# Patient Record
Sex: MALE | Race: OTHER | Employment: FULL TIME | URBAN - METROPOLITAN AREA
[De-identification: names, ages, dates, MRNs, and addresses within clinical notes are randomized per-mention and may not be internally consistent; named-entity substitution may affect disease eponyms.]

---

## 2017-04-03 RX ORDER — LISINOPRIL 20 MG/1
TABLET ORAL
Qty: 90 TABLET | Refills: 0 | Status: SHIPPED | OUTPATIENT
Start: 2017-04-03 | End: 2017-06-29

## 2017-04-03 NOTE — TELEPHONE ENCOUNTER
Refill given, but he's due for 6-month f/u. Make OV. Dianna Poole. Daren Multani MD  Diplomate, American Board of Internal Medicine  [facility]  [address] [place] [address],[address]Th [address], [address], [facility], [address] [place] [address]  T: [id number]; F: Tamekaeti 5

## 2017-05-04 RX ORDER — LEVOTHYROXINE SODIUM 0.07 MG/1
TABLET ORAL
Qty: 90 TABLET | Refills: 0 | Status: SHIPPED | OUTPATIENT
Start: 2017-05-04 | End: 2018-01-29

## 2017-05-19 ENCOUNTER — OFFICE VISIT (OUTPATIENT)
Dept: INTERNAL MEDICINE CLINIC | Facility: CLINIC | Age: 62
End: 2017-05-19

## 2017-05-19 VITALS
BODY MASS INDEX: 36.03 KG/M2 | HEART RATE: 81 BPM | SYSTOLIC BLOOD PRESSURE: 132 MMHG | TEMPERATURE: 98 F | RESPIRATION RATE: 20 BRPM | WEIGHT: 266 LBS | DIASTOLIC BLOOD PRESSURE: 80 MMHG | OXYGEN SATURATION: 98 % | HEIGHT: 72 IN

## 2017-05-19 DIAGNOSIS — E66.01 SEVERE OBESITY (BMI 35.0-39.9) WITH COMORBIDITY (HCC): ICD-10-CM

## 2017-05-19 DIAGNOSIS — K76.0 FATTY LIVER DISEASE, NONALCOHOLIC: ICD-10-CM

## 2017-05-19 DIAGNOSIS — E06.3 HASHIMOTO'S THYROIDITIS: ICD-10-CM

## 2017-05-19 DIAGNOSIS — N52.03 COMBINED ARTERIAL INSUFFICIENCY AND CORPORO-VENOUS OCCLUSIVE ERECTILE DYSFUNCTION: ICD-10-CM

## 2017-05-19 DIAGNOSIS — R97.20 ELEVATED PSA: ICD-10-CM

## 2017-05-19 DIAGNOSIS — R74.01 TRANSAMINITIS: ICD-10-CM

## 2017-05-19 DIAGNOSIS — I10 ESSENTIAL HYPERTENSION: Primary | ICD-10-CM

## 2017-05-19 DIAGNOSIS — R73.03 PREDIABETES: ICD-10-CM

## 2017-05-19 PROCEDURE — 99214 OFFICE O/P EST MOD 30 MIN: CPT | Performed by: INTERNAL MEDICINE

## 2017-05-20 PROBLEM — N52.03 COMBINED ARTERIAL INSUFFICIENCY AND CORPORO-VENOUS OCCLUSIVE ERECTILE DYSFUNCTION: Status: ACTIVE | Noted: 2017-05-20

## 2017-05-20 PROBLEM — E66.01 SEVERE OBESITY (BMI 35.0-39.9) WITH COMORBIDITY (HCC): Status: ACTIVE | Noted: 2017-05-20

## 2017-05-20 NOTE — PROGRESS NOTES
Patient presents with:   Other: 6-month f/u chronic medical conditions and disease burden status eval      HPI: The pt presents for 6-month f/u chronic medical conditions and disease burden status eval.  In particular, his chronic conditions are as follows: Pulse 81  Temp(Src) 98.4 °F (36.9 °C) (Oral)  Resp 20  Ht 72\"  Wt 266 lb  BMI 36.07 kg/m2  SpO2 98%  Wt Readings from Last 6 Encounters:  05/19/17 : 266 lb  09/09/16 : 266 lb 8 oz  11/13/15 : 268 lb 8 oz  10/22/15 : 271 lb 8 oz  09/04/15 : 270 lb 8 oz  09 CPX  LIPID PANEL - CPX  URINALYSIS, ROUTINE - CPX  CBC W/DIFF - CPX  COMP METABOLIC PANEL - CPX  TSH - THYROID ORDER SET  PSA (Screening) - CPX  Microalb/Creat Ratio, Random Urine [E]  T4 FREE  - THYROID ORDER SET    Meds & Refills for this Visit:  No pres

## 2017-06-29 RX ORDER — LISINOPRIL 20 MG/1
TABLET ORAL
Qty: 90 TABLET | Refills: 0 | Status: SHIPPED | OUTPATIENT
Start: 2017-06-29 | End: 2017-09-29

## 2017-07-29 DIAGNOSIS — E06.3 HASHIMOTO'S THYROIDITIS: ICD-10-CM

## 2017-08-02 RX ORDER — LEVOTHYROXINE SODIUM 0.07 MG/1
TABLET ORAL
Qty: 90 TABLET | Refills: 0 | Status: SHIPPED | OUTPATIENT
Start: 2017-08-02 | End: 2018-01-29

## 2017-09-29 RX ORDER — LISINOPRIL 20 MG/1
20 TABLET ORAL
Qty: 90 TABLET | Refills: 0 | Status: SHIPPED | OUTPATIENT
Start: 2017-09-29 | End: 2017-12-27

## 2017-09-29 NOTE — PROGRESS NOTES
I received fax from [employer] for refill on Lisinopril. E-prescribe sent. Timmy Ta. Melanie Colon MD  Diplomate, American Board of Internal Medicine  [facility]  [address] [facility],[address] Floor, [address], [facility] & [facility], [address] [place] [address]  T: [id number]; F: Hafnarraeti 5

## 2017-12-28 RX ORDER — LISINOPRIL 20 MG/1
TABLET ORAL
Qty: 90 TABLET | Refills: 0 | Status: SHIPPED | OUTPATIENT
Start: 2017-12-28 | End: 2018-01-29

## 2017-12-28 NOTE — TELEPHONE ENCOUNTER
Medication(s) to Refill:   Pending Prescriptions Disp Refills    LISINOPRIL 20 MG Oral Tab [Pharmacy Med Name: LISINOPRIL 20MG TABLETS] 90 tablet 0     Sig: TAKE 1 TABLET(20 MG) BY MOUTH EVERY DAY           Last Time Medication was Filled:  9/29/17    Last

## 2018-01-29 ENCOUNTER — OFFICE VISIT (OUTPATIENT)
Dept: INTERNAL MEDICINE CLINIC | Facility: CLINIC | Age: 63
End: 2018-01-29

## 2018-01-29 VITALS
SYSTOLIC BLOOD PRESSURE: 110 MMHG | RESPIRATION RATE: 16 BRPM | DIASTOLIC BLOOD PRESSURE: 80 MMHG | HEART RATE: 64 BPM | HEIGHT: 73.5 IN | WEIGHT: 265.75 LBS | BODY MASS INDEX: 34.47 KG/M2 | TEMPERATURE: 99 F

## 2018-01-29 DIAGNOSIS — R74.01 TRANSAMINITIS: ICD-10-CM

## 2018-01-29 DIAGNOSIS — R73.03 PREDIABETES: ICD-10-CM

## 2018-01-29 DIAGNOSIS — K76.0 FATTY LIVER DISEASE, NONALCOHOLIC: ICD-10-CM

## 2018-01-29 DIAGNOSIS — R97.20 ELEVATED PSA: ICD-10-CM

## 2018-01-29 DIAGNOSIS — E06.3 HASHIMOTO'S THYROIDITIS: ICD-10-CM

## 2018-01-29 DIAGNOSIS — E66.9 OBESITY (BMI 30-39.9): ICD-10-CM

## 2018-01-29 DIAGNOSIS — M25.531 BILATERAL WRIST PAIN: ICD-10-CM

## 2018-01-29 DIAGNOSIS — I87.2 CHRONIC VENOUS INSUFFICIENCY: ICD-10-CM

## 2018-01-29 DIAGNOSIS — I10 ESSENTIAL HYPERTENSION: Primary | ICD-10-CM

## 2018-01-29 DIAGNOSIS — M25.549 PAIN IN MULTIPLE FINGER JOINTS: ICD-10-CM

## 2018-01-29 DIAGNOSIS — M25.532 BILATERAL WRIST PAIN: ICD-10-CM

## 2018-01-29 PROBLEM — E03.8 HYPOTHYROIDISM DUE TO HASHIMOTO'S THYROIDITIS: Status: ACTIVE | Noted: 2018-01-29

## 2018-01-29 PROBLEM — E66.01 SEVERE OBESITY (BMI 35.0-39.9) WITH COMORBIDITY (HCC): Status: RESOLVED | Noted: 2017-05-20 | Resolved: 2018-01-29

## 2018-01-29 PROCEDURE — 99214 OFFICE O/P EST MOD 30 MIN: CPT | Performed by: INTERNAL MEDICINE

## 2018-01-29 RX ORDER — LEVOTHYROXINE SODIUM 0.07 MG/1
TABLET ORAL
Qty: 90 TABLET | Refills: 3 | Status: SHIPPED | OUTPATIENT
Start: 2018-01-29 | End: 2019-02-11

## 2018-01-29 RX ORDER — LISINOPRIL 20 MG/1
TABLET ORAL
Qty: 90 TABLET | Refills: 3 | Status: SHIPPED | OUTPATIENT
Start: 2018-01-29 | End: 2018-09-05

## 2018-01-29 NOTE — PATIENT INSTRUCTIONS
Understanding Chronic Venous Insufficiency  Problems with the veins in the legs may lead to chronic venous insufficiency (CVI). CVI means that there is a long-term problem with the veins not being able to pump blood back to your heart.  When this happens, · Increase blood flow back to your heart by elevating your legs, exercising daily, and wearing elastic stockings. · Boost blood flow in your legs by losing excess weight.   · If you must stand or sit in one place for a period of time, keep your blood movin

## 2018-01-31 PROBLEM — M16.12 PRIMARY OSTEOARTHRITIS OF LEFT HIP: Status: ACTIVE | Noted: 2018-01-31

## 2018-03-28 ENCOUNTER — APPOINTMENT (OUTPATIENT)
Dept: CT IMAGING | Age: 63
End: 2018-03-28
Attending: NURSE PRACTITIONER
Payer: COMMERCIAL

## 2018-03-28 ENCOUNTER — HOSPITAL ENCOUNTER (OUTPATIENT)
Age: 63
Discharge: ANOTHER HOSPITAL | End: 2018-03-28
Attending: FAMILY MEDICINE
Payer: COMMERCIAL

## 2018-03-28 ENCOUNTER — APPOINTMENT (OUTPATIENT)
Dept: GENERAL RADIOLOGY | Age: 63
End: 2018-03-28
Attending: NURSE PRACTITIONER
Payer: COMMERCIAL

## 2018-03-28 ENCOUNTER — HOSPITAL ENCOUNTER (INPATIENT)
Facility: HOSPITAL | Age: 63
LOS: 1 days | Discharge: HOME | DRG: 418 | End: 2018-03-29
Attending: STUDENT IN AN ORGANIZED HEALTH CARE EDUCATION/TRAINING PROGRAM | Admitting: COLON & RECTAL SURGERY
Payer: COMMERCIAL

## 2018-03-28 VITALS
BODY MASS INDEX: 34 KG/M2 | DIASTOLIC BLOOD PRESSURE: 107 MMHG | HEART RATE: 93 BPM | TEMPERATURE: 99 F | RESPIRATION RATE: 18 BRPM | WEIGHT: 265 LBS | OXYGEN SATURATION: 98 % | SYSTOLIC BLOOD PRESSURE: 150 MMHG

## 2018-03-28 DIAGNOSIS — K83.8 CHOLANGIECTASIS: ICD-10-CM

## 2018-03-28 DIAGNOSIS — K81.0 ACUTE CHOLECYSTITIS: Primary | ICD-10-CM

## 2018-03-28 DIAGNOSIS — D72.829 LEUKOCYTOSIS, UNSPECIFIED TYPE: ICD-10-CM

## 2018-03-28 DIAGNOSIS — R11.2 NAUSEA AND VOMITING, INTRACTABILITY OF VOMITING NOT SPECIFIED, UNSPECIFIED VOMITING TYPE: ICD-10-CM

## 2018-03-28 DIAGNOSIS — R10.13 EPIGASTRIC PAIN: ICD-10-CM

## 2018-03-28 LAB
#LYMPHOCYTE IC: 1.2 X10ˆ3/UL (ref 0.9–3.2)
#MXD IC: 0.7 X10ˆ3/UL (ref 0.1–1)
#NEUTROPHIL IC: 12.1 X10ˆ3/UL (ref 1.3–6.7)
CREAT SERPL-MCNC: 0.8 MG/DL (ref 0.7–1.3)
GLUCOSE BLD-MCNC: 131 MG/DL (ref 70–99)
HCT IC: 47.2 % (ref 37–54)
HGB IC: 15.7 G/DL (ref 13–17)
ISTAT BLOOD GAS TCO2: 26 MMOL/L (ref 22–32)
ISTAT BUN: 15 MG/DL (ref 8–20)
ISTAT CHLORIDE: 98 MMOL/L (ref 101–111)
ISTAT HEMATOCRIT: 49 % (ref 37–53)
ISTAT IONIZED CALCIUM: 1.14 MMOL/L (ref 1.12–1.32)
ISTAT POTASSIUM: 3.8 MMOL/L (ref 3.6–5.1)
ISTAT SODIUM: 137 MMOL/L (ref 136–144)
ISTAT TROPONIN: <0.1 NG/ML (ref ?–0.1)
LYMPHOCYTES NFR BLD AUTO: 8.9 %
MCH IC: 27 PG (ref 27–33.2)
MCHC IC: 33.3 G/DL (ref 31–37)
MCV IC: 81.1 FL (ref 80–99)
MIXED CELL %: 5.3 %
NEUTROPHILS NFR BLD AUTO: 85.8 %
PLT IC: 158 X10ˆ3/UL (ref 150–450)
POCT BILIRUBIN URINE: NEGATIVE
POCT GLUCOSE URINE: NEGATIVE MG/DL
POCT KETONE URINE: NEGATIVE MG/DL
POCT LEUKOCYTE ESTERASE URINE: NEGATIVE
POCT NITRITE URINE: NEGATIVE
POCT PH URINE: 6.5 (ref 5–8)
POCT PROTEIN URINE: >=300 MG/DL
POCT SPECIFIC GRAVITY URINE: 1.03
POCT URINE COLOR: YELLOW
POCT UROBILINOGEN URINE: 0.2 MG/DL
RBC IC: 5.82 X10ˆ6/UL (ref 4.3–5.7)
WBC IC: 14 X10ˆ3/UL (ref 4–13)

## 2018-03-28 PROCEDURE — 85025 COMPLETE CBC W/AUTO DIFF WBC: CPT | Performed by: NURSE PRACTITIONER

## 2018-03-28 PROCEDURE — 99285 EMERGENCY DEPT VISIT HI MDM: CPT

## 2018-03-28 PROCEDURE — 96365 THER/PROPH/DIAG IV INF INIT: CPT

## 2018-03-28 PROCEDURE — 96374 THER/PROPH/DIAG INJ IV PUSH: CPT

## 2018-03-28 PROCEDURE — 85730 THROMBOPLASTIN TIME PARTIAL: CPT | Performed by: STUDENT IN AN ORGANIZED HEALTH CARE EDUCATION/TRAINING PROGRAM

## 2018-03-28 PROCEDURE — 80053 COMPREHEN METABOLIC PANEL: CPT | Performed by: STUDENT IN AN ORGANIZED HEALTH CARE EDUCATION/TRAINING PROGRAM

## 2018-03-28 PROCEDURE — 93010 ELECTROCARDIOGRAM REPORT: CPT

## 2018-03-28 PROCEDURE — 74177 CT ABD & PELVIS W/CONTRAST: CPT | Performed by: NURSE PRACTITIONER

## 2018-03-28 PROCEDURE — 84484 ASSAY OF TROPONIN QUANT: CPT

## 2018-03-28 PROCEDURE — 99205 OFFICE O/P NEW HI 60 MIN: CPT

## 2018-03-28 PROCEDURE — 85610 PROTHROMBIN TIME: CPT | Performed by: STUDENT IN AN ORGANIZED HEALTH CARE EDUCATION/TRAINING PROGRAM

## 2018-03-28 PROCEDURE — 96361 HYDRATE IV INFUSION ADD-ON: CPT

## 2018-03-28 PROCEDURE — 81002 URINALYSIS NONAUTO W/O SCOPE: CPT | Performed by: NURSE PRACTITIONER

## 2018-03-28 PROCEDURE — 80047 BASIC METABLC PNL IONIZED CA: CPT

## 2018-03-28 PROCEDURE — 93005 ELECTROCARDIOGRAM TRACING: CPT

## 2018-03-28 PROCEDURE — 99215 OFFICE O/P EST HI 40 MIN: CPT

## 2018-03-28 PROCEDURE — 71046 X-RAY EXAM CHEST 2 VIEWS: CPT | Performed by: NURSE PRACTITIONER

## 2018-03-28 PROCEDURE — 83690 ASSAY OF LIPASE: CPT | Performed by: STUDENT IN AN ORGANIZED HEALTH CARE EDUCATION/TRAINING PROGRAM

## 2018-03-28 PROCEDURE — 85025 COMPLETE CBC W/AUTO DIFF WBC: CPT | Performed by: STUDENT IN AN ORGANIZED HEALTH CARE EDUCATION/TRAINING PROGRAM

## 2018-03-28 RX ORDER — LABETALOL HYDROCHLORIDE 5 MG/ML
10 INJECTION, SOLUTION INTRAVENOUS ONCE AS NEEDED
Status: COMPLETED | OUTPATIENT
Start: 2018-03-28 | End: 2018-03-29

## 2018-03-28 RX ORDER — DEXTROSE, SODIUM CHLORIDE, AND POTASSIUM CHLORIDE 5; .45; .15 G/100ML; G/100ML; G/100ML
INJECTION INTRAVENOUS CONTINUOUS
Status: DISCONTINUED | OUTPATIENT
Start: 2018-03-28 | End: 2018-03-29

## 2018-03-28 RX ORDER — KETOROLAC TROMETHAMINE 30 MG/ML
30 INJECTION, SOLUTION INTRAMUSCULAR; INTRAVENOUS ONCE
Status: COMPLETED | OUTPATIENT
Start: 2018-03-28 | End: 2018-03-28

## 2018-03-28 RX ORDER — HEPARIN SODIUM 5000 [USP'U]/ML
5000 INJECTION, SOLUTION INTRAVENOUS; SUBCUTANEOUS EVERY 12 HOURS SCHEDULED
Status: DISCONTINUED | OUTPATIENT
Start: 2018-03-28 | End: 2018-03-29

## 2018-03-28 RX ORDER — HYDROMORPHONE HYDROCHLORIDE 1 MG/ML
0.5 INJECTION, SOLUTION INTRAMUSCULAR; INTRAVENOUS; SUBCUTANEOUS EVERY 30 MIN PRN
Status: DISCONTINUED | OUTPATIENT
Start: 2018-03-28 | End: 2018-03-29

## 2018-03-28 RX ORDER — ONDANSETRON 2 MG/ML
4 INJECTION INTRAMUSCULAR; INTRAVENOUS EVERY 6 HOURS PRN
Status: DISCONTINUED | OUTPATIENT
Start: 2018-03-28 | End: 2018-03-29

## 2018-03-28 RX ORDER — LABETALOL HYDROCHLORIDE 5 MG/ML
10 INJECTION, SOLUTION INTRAVENOUS EVERY 4 HOURS PRN
Status: DISCONTINUED | OUTPATIENT
Start: 2018-03-28 | End: 2018-03-29

## 2018-03-28 RX ORDER — SODIUM CHLORIDE 9 MG/ML
INJECTION, SOLUTION INTRAVENOUS CONTINUOUS
Status: CANCELLED | OUTPATIENT
Start: 2018-03-28 | End: 2018-03-28

## 2018-03-28 RX ORDER — LABETALOL HYDROCHLORIDE 5 MG/ML
10 INJECTION, SOLUTION INTRAVENOUS ONCE
Status: COMPLETED | OUTPATIENT
Start: 2018-03-29 | End: 2018-03-29

## 2018-03-28 NOTE — ED PROVIDER NOTES
Patient Seen in: [facility]    History   Patient presents with:  Abdomen/Flank Pain (GI/)    Stated Complaint: abdominal pain    HPI  Patient is a 60-year-old that was awoken from his sleep with mid abdominal pain.   Patient st Comment: Knee surgery Bilateral, Early 1990s, ruptured                patellar tendon.  Laterality : bilateral  June 2013: HIP REPLACEMENT SURGERY      Comment: R ROSE by Dr. Mary vogel @ [facility]  No date: ORTHOPEDIC SURG (PBP)      Comment: Abdominal: Soft. Bowel sounds are normal. He exhibits no shifting dullness, no distension, no pulsatile liver, no fluid wave, no abdominal bruit, no ascites, no pulsatile midline mass and no mass. There is tenderness (mild).  There is no rigidity, no reboun PROCEDURE:  XR CHEST PA + LAT CHEST (CPT=71046)  INDICATIONS:  abdominal pain  COMPARISON:  [facility] , XR CHEST PA + LAT CHEST (CPT=71020), 10/09/2015, 7:33.  TECHNIQUE:  PA and lateral chest radiographs were obtained.   PATIENT STATED HISTORY: (As transcribe

## 2018-03-29 ENCOUNTER — ANESTHESIA EVENT (OUTPATIENT)
Dept: SURGERY | Facility: HOSPITAL | Age: 63
DRG: 418 | End: 2018-03-29
Payer: COMMERCIAL

## 2018-03-29 ENCOUNTER — APPOINTMENT (OUTPATIENT)
Dept: GENERAL RADIOLOGY | Facility: HOSPITAL | Age: 63
DRG: 418 | End: 2018-03-29
Attending: COLON & RECTAL SURGERY
Payer: COMMERCIAL

## 2018-03-29 ENCOUNTER — ANESTHESIA (OUTPATIENT)
Dept: SURGERY | Facility: HOSPITAL | Age: 63
DRG: 418 | End: 2018-03-29
Payer: COMMERCIAL

## 2018-03-29 ENCOUNTER — SURGERY (OUTPATIENT)
Age: 63
End: 2018-03-29

## 2018-03-29 VITALS
BODY MASS INDEX: 34 KG/M2 | TEMPERATURE: 98 F | WEIGHT: 259.88 LBS | RESPIRATION RATE: 16 BRPM | SYSTOLIC BLOOD PRESSURE: 133 MMHG | DIASTOLIC BLOOD PRESSURE: 86 MMHG | OXYGEN SATURATION: 96 % | HEART RATE: 91 BPM

## 2018-03-29 PROBLEM — R11.2 NAUSEA AND VOMITING, INTRACTABILITY OF VOMITING NOT SPECIFIED, UNSPECIFIED VOMITING TYPE: Status: RESOLVED | Noted: 2018-03-28 | Resolved: 2018-03-29

## 2018-03-29 PROBLEM — D72.829 LEUKOCYTOSIS, UNSPECIFIED TYPE: Status: RESOLVED | Noted: 2018-03-28 | Resolved: 2018-03-29

## 2018-03-29 PROBLEM — K81.0 ACUTE CHOLECYSTITIS: Status: RESOLVED | Noted: 2018-03-28 | Resolved: 2018-03-29

## 2018-03-29 LAB
ATRIAL RATE: 77 BPM
P AXIS: 56 DEGREES
P-R INTERVAL: 210 MS
Q-T INTERVAL: 372 MS
QRS DURATION: 98 MS
QTC CALCULATION (BEZET): 420 MS
R AXIS: -35 DEGREES
T AXIS: 7 DEGREES
VENTRICULAR RATE: 77 BPM

## 2018-03-29 PROCEDURE — BF121ZZ FLUOROSCOPY OF GALLBLADDER USING LOW OSMOLAR CONTRAST: ICD-10-PCS | Performed by: COLON & RECTAL SURGERY

## 2018-03-29 PROCEDURE — 0FT44ZZ RESECTION OF GALLBLADDER, PERCUTANEOUS ENDOSCOPIC APPROACH: ICD-10-PCS | Performed by: COLON & RECTAL SURGERY

## 2018-03-29 PROCEDURE — 74300 X-RAY BILE DUCTS/PANCREAS: CPT | Performed by: COLON & RECTAL SURGERY

## 2018-03-29 PROCEDURE — 88304 TISSUE EXAM BY PATHOLOGIST: CPT | Performed by: COLON & RECTAL SURGERY

## 2018-03-29 RX ORDER — NALOXONE HYDROCHLORIDE 0.4 MG/ML
80 INJECTION, SOLUTION INTRAMUSCULAR; INTRAVENOUS; SUBCUTANEOUS AS NEEDED
Status: DISCONTINUED | OUTPATIENT
Start: 2018-03-29 | End: 2018-03-29 | Stop reason: HOSPADM

## 2018-03-29 RX ORDER — MEPERIDINE HYDROCHLORIDE 25 MG/ML
12.5 INJECTION INTRAMUSCULAR; INTRAVENOUS; SUBCUTANEOUS AS NEEDED
Status: DISCONTINUED | OUTPATIENT
Start: 2018-03-29 | End: 2018-03-29 | Stop reason: HOSPADM

## 2018-03-29 RX ORDER — MORPHINE SULFATE 4 MG/ML
2 INJECTION, SOLUTION INTRAMUSCULAR; INTRAVENOUS EVERY 5 MIN PRN
Status: DISCONTINUED | OUTPATIENT
Start: 2018-03-29 | End: 2018-03-29 | Stop reason: HOSPADM

## 2018-03-29 RX ORDER — TRAMADOL HYDROCHLORIDE 50 MG/1
50 TABLET ORAL EVERY 6 HOURS PRN
Status: DISCONTINUED | OUTPATIENT
Start: 2018-03-29 | End: 2018-03-29

## 2018-03-29 RX ORDER — DEXAMETHASONE SODIUM PHOSPHATE 4 MG/ML
4 VIAL (ML) INJECTION AS NEEDED
Status: DISCONTINUED | OUTPATIENT
Start: 2018-03-29 | End: 2018-03-29 | Stop reason: HOSPADM

## 2018-03-29 RX ORDER — ONDANSETRON 2 MG/ML
4 INJECTION INTRAMUSCULAR; INTRAVENOUS AS NEEDED
Status: DISCONTINUED | OUTPATIENT
Start: 2018-03-29 | End: 2018-03-29 | Stop reason: HOSPADM

## 2018-03-29 RX ORDER — SODIUM CHLORIDE, SODIUM LACTATE, POTASSIUM CHLORIDE, CALCIUM CHLORIDE 600; 310; 30; 20 MG/100ML; MG/100ML; MG/100ML; MG/100ML
INJECTION, SOLUTION INTRAVENOUS CONTINUOUS
Status: DISCONTINUED | OUTPATIENT
Start: 2018-03-29 | End: 2018-03-29 | Stop reason: HOSPADM

## 2018-03-29 RX ORDER — CEFOXITIN 2 G/1
INJECTION, POWDER, FOR SOLUTION INTRAVENOUS
Status: DISCONTINUED | OUTPATIENT
Start: 2018-03-29 | End: 2018-03-29

## 2018-03-29 RX ORDER — BUPIVACAINE HYDROCHLORIDE AND EPINEPHRINE 2.5; 5 MG/ML; UG/ML
INJECTION, SOLUTION EPIDURAL; INFILTRATION; INTRACAUDAL; PERINEURAL AS NEEDED
Status: DISCONTINUED | OUTPATIENT
Start: 2018-03-29 | End: 2018-03-29

## 2018-03-29 RX ORDER — FAMOTIDINE 10 MG/ML
20 INJECTION, SOLUTION INTRAVENOUS 2 TIMES DAILY
Status: DISCONTINUED | OUTPATIENT
Start: 2018-03-29 | End: 2018-03-29

## 2018-03-29 RX ORDER — FAMOTIDINE 20 MG/1
20 TABLET ORAL 2 TIMES DAILY
Status: DISCONTINUED | OUTPATIENT
Start: 2018-03-29 | End: 2018-03-29

## 2018-03-29 RX ORDER — HEPARIN SODIUM 5000 [USP'U]/ML
5000 INJECTION, SOLUTION INTRAVENOUS; SUBCUTANEOUS EVERY 8 HOURS SCHEDULED
Status: DISCONTINUED | OUTPATIENT
Start: 2018-03-29 | End: 2018-03-29

## 2018-03-29 RX ORDER — TRAMADOL HYDROCHLORIDE 50 MG/1
50 TABLET ORAL
Qty: 20 TABLET | Refills: 0 | Status: SHIPPED | OUTPATIENT
Start: 2018-03-29 | End: 2018-04-05

## 2018-03-29 NOTE — OPERATIVE REPORT
[facility]  Operative Note    Darian Hinton.  Location: OR   [facility] [id number] MRN [id number]   Admission Date 3/28/2018 Operation Date 3/29/2018   Attending Physician Frank Centeno MD Operating Physician Jennifer Rosas MD       Patient Name of Procedure: The patient was transported to the operating room and placed on the operating table in supine position. General endotracheal anesthesia was administered. Preoperative antibiotics were given.  The abdomen was prepped and draped in the usual st removed, and two clips were placed on the patient's side of the cystic duct, which was divided by Endoshears.  Next, one clip was placed on the cystic artery on the specimen side, two towards the patient side, and the cystic artery was divided with the Endo

## 2018-03-29 NOTE — PROGRESS NOTES
Patient admitted via cart from the ED in stable condition. Wife at bedside. Admission navigator completed. POC discussed. Oriented to room. Safety precautions initiated. Bed in low position. Call light in reach.

## 2018-03-29 NOTE — CONSULTS
[facility]  Report of Consultation    Darcy Bernard.  Patient Status:  Inpatient    1955 MRN [id number]   [facility] 3NW-A Attending Brittany Torre MD   Hosp Day # 1 PCP Edwin Herrera MD     Reason for Consultation:  Chet Carrion History:  No date: FEMUR/KNEE SURG UNLISTED      Comment: Knee arthroscopy bilateral, x2, Most recent in               2005, R knee. Had L knee 8 years prior.                 Laterality: Bilateral  1990s: FEMUR/KNEE SURG UNLISTED      Comment: Knee surgery polyphagia  ENMT:  Negative for ear drainage, hearing loss and nasal drainage  Eyes:  Negative for eye discharge and vision loss  Gastrointestinal:  Positive for abdominal pain, positive for constipation, positive for anorexia, negative for diarrhea, posit GLU  113*   BUN  15   CREATSERUM  0.99   GFRAA  95   GFRNAA  82   CA  9.2   ALB  4.1   NA  133*   K  3.8   CL  100*   CO2  26.0   ALKPHO  101   AST  39   ALT  58   BILT  2.4*   TP  8.9*         Recent Labs   Lab  03/28/18 2124   PTP  14.8*   INR  1.11* mass.  BONES:  Mild degenerative changes of spine. Right hip prosthesis. Arthritic changes of left hip. LUNG BASES:  No visible pulmonary or pleural disease.       =====  CONCLUSION:    1.  Cholelithiasis with mild induration of the pericholecystic fat, understanding. 3. Verify informed consent  4. Discussed that we will repair his umbilical hernia in the course of today's operation. He also has bilateral inguinal hernias which may need to be addressed at a later date. 5. Strict NPO  6.  Antibiotics per

## 2018-03-29 NOTE — ANESTHESIA PREPROCEDURE EVALUATION
PRE-OP EVALUATION    Patient Name: Shannan Villela. Pre-op Diagnosis: CHOLANGIECTASIS    Procedure(s):  LAP PARKER W IOC    Surgeon(s) and Role:     * Randy Dee MD - Primary    Pre-op vitals reviewed.   Temp: 98.9 °F (37.2 °C)  Pulse: 6 x2, Most recent in               2005, R knee. Had L knee 8 years prior. Laterality: Bilateral  1990s: FEMUR/KNEE SURG UNLISTED      Comment: Knee surgery Bilateral, Early 1990s, ruptured                patellar tendon.  Laterality : Cozetta Moment liver disease (fatty liver)                 Cardiovascular      ECG reviewed.           (+) obesity  (+) hypertension                  (+) dysrhythmias (1* AVB)                   Endo/Other      (+) diabetes (preDM)     (+) hypothyroidism                (+)

## 2018-03-29 NOTE — PROGRESS NOTES
Patient returned to unit from PACU at this time. Alert and oriented x4. Denies need for pain medication. Clear liquid menu provided, educated that we will advance his diet as tolerated, verbalized understanding. Denies any nausea. Denies passing gas.  Room

## 2018-03-29 NOTE — ED PROVIDER NOTES
Patient Seen in: [facility] Emergency Department    History   Patient presents with:  Abdomen/Flank Pain (GI/)    Stated Complaint: abd pain    HPI    Patient is a 52-year-old male who presents the emergency department reporting severe upper abdom [employer]  Alcohol use: Yes           1.8 oz/week     Standard drinks or equivalent: 3 per week      Review of Systems    Positive for stated complaint: abd pain  Other systems are as noted in HPI. Constitutional and vital signs reviewed.       All other syste components within normal limits   PTT, ACTIVATED - Abnormal; Notable for the following:     PTT 34.7 (*)     All other components within normal limits    Narrative: The aPTT Heparin Therapeutic Range is approximately 65- 104 seconds.  The therapeutic ra additional imaging at this time. He will plan to have the patient scheduled for surgery at 1130, case likely will be performed by his partner, Dr. Jazmin Will. Patient educated on the plan. All questions asked and answered.     Admission disposition: 3/28/20

## 2018-03-29 NOTE — PROGRESS NOTES
Patient discharged home at this time. All discharge instructions reviewed with patient and wife at the bedside. All questions and concerns addressed. IV access removed prior to discharge.  Pt denied need for wheelchair, ambulated off unit with wife with misha

## 2018-03-29 NOTE — PAYOR COMM NOTE
--------------  ADMISSION REVIEW     Payor: [address]  Subscriber #:  [id number]  Authorization Number: N/A    Admit date: 3/28/18  Admit time: 2243       Admitting Physician: Marc Demarco MD  Attending Physician:  Marc Demarco MD  [facility] 2107]  Pulse: 110 [03/28/18 2107]  Resp: 18 [03/28/18 2107]  Temp: (!) 97.2 °F (36.2 °C) [03/28/18 2112]  Temp src: Temporal [03/28/18 2112]  SpO2: n/a  O2 Device: n/a    Current:/100   Pulse 110   Temp (!) 97.2 °F (36.2 °C) (Temporal)   Resp 18 Course as of Mar 28 2223  ------------------------------------------------------------       MDM     Extensive differential was considered including diverticulitis, cholecystitis, appendicitis, colitis, gastroenteritis, bowel obstruction, and other gi, inf MG/ML injection 0.5 mg     Date Action Dose Route User    3/29/2018 1456 Given 0.5 mg Intravenous Brittni Hernández RN      iohexol (OMNIPAQUE) 350 MG/ML injection 100 mL     Date Action Dose Route User    Admitted on 3/28/2018    Discharged on 3/28/2018 laparoscopic cholecystectomy with cholangiogram. All risks, benefits, complications and alternatives to the proposed operation were fully discussed with the patient. All questions from the patient were answered in detail.  A description of the procedure and

## 2018-03-29 NOTE — PROGRESS NOTES
[facility] Pharmacy Note: Antimicrobial Weight Dose Adjustment for: piperacillin/tazobactam (Delynn Innocent)    Melina Poole. is a 65year old male who has been prescribed piperacillin/tazobactam (ZOSYN) 3.375 gm every 8 hours.   CrCl is estimated creatinine clear

## 2018-03-29 NOTE — ED INITIAL ASSESSMENT (HPI)
Pt presents from immediate care for evaluation of abnormal ct scan states abd pain since 12 midnight

## 2018-03-29 NOTE — PROGRESS NOTES
Patient left at this time for OR in stable condition. IV fluids infusing. Consent signed and on chart. NPO. Wife at the bedside.

## 2018-03-29 NOTE — CM/SW NOTE
Order received to speak with patient regarding insurance questions, currently down in OR.     1600: call to patient's room to f/u, no answer. CM to follow up tomorrow.      Michelle Saucedo RN,   Phone [phone]  Pager [phone]

## 2018-03-29 NOTE — ED PROVIDER NOTES
Patient Seen in: [facility]    History   Patient presents with:  Abdomen/Flank Pain (GI/)    Stated Complaint: abdominal pain    Patient's care was transferred to me at the time of shift change.      HPI  50-year-old that was a reviewed. All other systems reviewed and negative except as noted above.     Physical Exam   ED Triage Vitals [03/28/18 1708]  BP: (!) 200/104  Pulse: 81  Resp: 20  Temp: 99.7 °F (37.6 °C)  Temp src: Temporal  SpO2: 100 %  O2 Device: None (Room air) pm    Follow-up:  No follow-up provider specified.       Medications Prescribed:  Current Discharge Medication List

## 2018-03-29 NOTE — ANESTHESIA POSTPROCEDURE EVALUATION
[facility] Eric Ly.  Patient Status:  Inpatient   Age/Gender 65year old male MRN [id number]   Location [address] [facility] Attending Sheila Nguyen MD   [facility] Day # 1 PCP Tacos Curry MD       Anesthesia Post-op Note    Procedure(s):

## 2018-04-05 ENCOUNTER — OFFICE VISIT (OUTPATIENT)
Dept: SURGERY | Facility: CLINIC | Age: 63
End: 2018-04-05

## 2018-04-05 VITALS — BODY MASS INDEX: 33.72 KG/M2 | TEMPERATURE: 99 F | WEIGHT: 260 LBS | HEIGHT: 73.5 IN

## 2018-04-05 DIAGNOSIS — Z09 FOLLOW-UP EXAMINATION: Primary | ICD-10-CM

## 2018-04-05 PROCEDURE — 99024 POSTOP FOLLOW-UP VISIT: CPT | Performed by: COLON & RECTAL SURGERY

## 2018-04-05 NOTE — PATIENT INSTRUCTIONS
Assessment   Follow-up examination  (primary encounter diagnosis)      Plan   Patient is doing well after lap scopic cholecystectomy. Continue with diet as tolerated    Continue frequent ambulation.   Weight 6 weeks after surgery before resuming drainage

## 2018-04-05 NOTE — PROGRESS NOTES
Post Operative Visit Note       Active Problems  1.  Follow-up examination         Chief Complaint   Mild loose stool       History of Present Illness   Durga Odom. 71-year-old gentleman who underwent laparoscopic cholecystectomy for acute cholec reconstruction    The family history and social history have been reviewed by me today.     Family History   Problem Relation Age of Onset   • Diabetes Father    • Alzheimer's disease [OTHER] Father    • Leukemia [OTHER] Mother      Social History    Shirley myalgias. Skin: Negative for color change and rash. Neurological: Negative for tremors, syncope and weakness. Hematological: Negative for adenopathy. Does not bruise/bleed easily.    Psychiatric/Behavioral: Negative for behavioral problems and sleep d activity or heavy weightlifting. The umbilical hernia seen on the CT scan was incorporated into the closure of our supraumbilical port site. Since this was just a suture repair there is a somewhat increased risk of recurrent hernia at this area.     Ron Damico

## 2018-04-16 RX ORDER — LISINOPRIL 20 MG/1
TABLET ORAL
Qty: 90 TABLET | Refills: 0 | OUTPATIENT
Start: 2018-04-16

## 2018-04-16 NOTE — TELEPHONE ENCOUNTER
Refill requested: Lisinopril 20     Passed protocol    Last refill: 1/29/18 Lisinopril 20 mg #90 3R [facility] in [place]     Relevant Labs:    -BP Readings from Last 3 Encounters:  03/29/18 : 133/86  03/28/18 : (!) 150/107  01/29/18 : 110/80      Last O

## 2018-04-17 RX ORDER — LISINOPRIL 20 MG/1
TABLET ORAL
Qty: 90 TABLET | Refills: 0 | OUTPATIENT
Start: 2018-04-17

## 2018-04-17 NOTE — TELEPHONE ENCOUNTER
Medication(s) to Refill:   Pending Prescriptions Disp Refills    LISINOPRIL 20 MG Oral Tab [Pharmacy Med Name: LISINOPRIL 20MG TABLETS] 90 tablet 0     Sig: TAKE 1 TABLET(20 MG) BY MOUTH EVERY DAY          Protocol Failed: Refill too soon, did not pass protocol   Last Time Medication was Filled: 90 3 R 1/29/18     Last Office Visit with PCP: 1/29/2018    When Patient was Due Back to the Office: 1 year   (from when PCP last addressed condition)    Future Appointments:  No future appointments.     Last Blood Pressures:  BP Readings from Last 2 Encounters:  03/29/18 : 133/86  03/28/18 : (!) 150/107    Recent Labs:    Lab Results  Component Value Date    (H) 03/28/2018   BUN 15 03/28/2018   CREATSERUM 0.99 03/28/2018   GFR 73 11/04/2016   GFRNAA 82 03/28/2018   GFRAA 95 03/28/2018   CA 9.2 03/28/2018   ALKPHO 101 03/28/2018   AST 39 03/28/2018   ALT 58 03/28/2018   BILT 2.4 (H) 03/28/2018   TP 8.9 (H) 03/28/2018   ALB 4.1 03/28/2018    (L) 03/28/2018   K 3.8 03/28/2018    (L) 03/28/2018   CO2 26.0 03/28/2018

## 2018-08-16 RX ORDER — LISINOPRIL 20 MG/1
20 TABLET ORAL DAILY
Qty: 90 TABLET | Refills: 1 | OUTPATIENT
Start: 2018-08-16

## 2018-08-16 NOTE — TELEPHONE ENCOUNTER
Refill requested: Lisinopril 20 mg     Passed protocol      Last refill: 1/29/18 #90 3R - Due Macario White - due around 10/2018    Declined to pharmacy - refill request too soon

## 2018-08-16 NOTE — TELEPHONE ENCOUNTER
From: Darcy Bernard.   Sent: 8/15/2018 6:20 PM CDT  Subject: Medication Renewal Request    Darcy Bernard. would like a refill of the following medications:     lisinopril 20 MG Oral Tab Edwin Herrera MD]    Preferred pharmacy: [facility]

## 2018-08-30 ENCOUNTER — LABORATORY ENCOUNTER (OUTPATIENT)
Dept: LAB | Age: 63
End: 2018-08-30
Attending: ORTHOPAEDIC SURGERY
Payer: COMMERCIAL

## 2018-08-30 ENCOUNTER — APPOINTMENT (OUTPATIENT)
Dept: LAB | Age: 63
End: 2018-08-30
Attending: ORTHOPAEDIC SURGERY
Payer: COMMERCIAL

## 2018-08-30 DIAGNOSIS — M25.532 BILATERAL WRIST PAIN: ICD-10-CM

## 2018-08-30 DIAGNOSIS — R97.20 ELEVATED PSA: ICD-10-CM

## 2018-08-30 DIAGNOSIS — E06.3 HASHIMOTO'S THYROIDITIS: ICD-10-CM

## 2018-08-30 DIAGNOSIS — M25.531 BILATERAL WRIST PAIN: ICD-10-CM

## 2018-08-30 DIAGNOSIS — M16.12 PRIMARY OSTEOARTHRITIS OF LEFT HIP: ICD-10-CM

## 2018-08-30 DIAGNOSIS — I10 ESSENTIAL HYPERTENSION: ICD-10-CM

## 2018-08-30 DIAGNOSIS — M25.549 PAIN IN MULTIPLE FINGER JOINTS: ICD-10-CM

## 2018-08-30 LAB
ALBUMIN SERPL-MCNC: 4 G/DL (ref 3.5–4.8)
ALBUMIN/GLOB SERPL: 0.9 {RATIO} (ref 1–2)
ALP LIVER SERPL-CCNC: 112 U/L (ref 45–117)
ALT SERPL-CCNC: 63 U/L (ref 17–63)
ANION GAP SERPL CALC-SCNC: 5 MMOL/L (ref 0–18)
ANTIBODY SCREEN: NEGATIVE
APTT PPP: 31.1 SECONDS (ref 26.1–34.6)
AST SERPL-CCNC: 44 U/L (ref 15–41)
ATRIAL RATE: 234 BPM
BASOPHILS # BLD AUTO: 0.06 X10(3) UL (ref 0–0.1)
BASOPHILS NFR BLD AUTO: 1 %
BILIRUB SERPL-MCNC: 1.1 MG/DL (ref 0.1–2)
BUN BLD-MCNC: 20 MG/DL (ref 8–20)
BUN/CREAT SERPL: 19.8 (ref 10–20)
CALCIUM BLD-MCNC: 9.6 MG/DL (ref 8.3–10.3)
CHLORIDE SERPL-SCNC: 108 MMOL/L (ref 101–111)
CHOLEST SMN-MCNC: 133 MG/DL (ref ?–200)
CO2 SERPL-SCNC: 29 MMOL/L (ref 22–32)
COMPLEXED PSA SERPL-MCNC: 13.8 NG/ML (ref 0.01–4)
CREAT BLD-MCNC: 1.01 MG/DL (ref 0.7–1.3)
CREAT UR-SCNC: 227 MG/DL
CRP SERPL-MCNC: 0.37 MG/DL (ref ?–1)
EOSINOPHIL # BLD AUTO: 0.25 X10(3) UL (ref 0–0.3)
EOSINOPHIL NFR BLD AUTO: 4.2 %
ERYTHROCYTE [DISTWIDTH] IN BLOOD BY AUTOMATED COUNT: 13.7 % (ref 11.5–16)
EST. AVERAGE GLUCOSE BLD GHB EST-MCNC: 123 MG/DL (ref 68–126)
GLOBULIN PLAS-MCNC: 4.5 G/DL (ref 2.5–4)
GLUCOSE BLD-MCNC: 90 MG/DL (ref 70–99)
HBA1C MFR BLD HPLC: 5.9 % (ref ?–5.7)
HCT VFR BLD AUTO: 45.6 % (ref 37–53)
HDLC SERPL-MCNC: 49 MG/DL (ref 40–59)
HGB BLD-MCNC: 14.5 G/DL (ref 13–17)
IMMATURE GRANULOCYTE COUNT: 0.01 X10(3) UL (ref 0–1)
IMMATURE GRANULOCYTE RATIO %: 0.2 %
INR BLD: 1.15 (ref 0.9–1.1)
LDLC SERPL CALC-MCNC: 72 MG/DL (ref ?–100)
LYMPHOCYTES # BLD AUTO: 1.39 X10(3) UL (ref 0.9–4)
LYMPHOCYTES NFR BLD AUTO: 23.3 %
M PROTEIN MFR SERPL ELPH: 8.5 G/DL (ref 6.1–8.3)
MCH RBC QN AUTO: 26.5 PG (ref 27–33.2)
MCHC RBC AUTO-ENTMCNC: 31.8 G/DL (ref 31–37)
MCV RBC AUTO: 83.4 FL (ref 80–99)
MICROALBUMIN UR-MCNC: 0.97 MG/DL
MICROALBUMIN/CREAT 24H UR-RTO: 4.3 UG/MG (ref ?–30)
MONOCYTES # BLD AUTO: 0.41 X10(3) UL (ref 0.1–1)
MONOCYTES NFR BLD AUTO: 6.9 %
NEUTROPHIL ABS PRELIM: 3.85 X10 (3) UL (ref 1.3–6.7)
NEUTROPHILS # BLD AUTO: 3.85 X10(3) UL (ref 1.3–6.7)
NEUTROPHILS NFR BLD AUTO: 64.4 %
NONHDLC SERPL-MCNC: 84 MG/DL (ref ?–130)
OSMOLALITY SERPL CALC.SUM OF ELEC: 296 MOSM/KG (ref 275–295)
PLATELET # BLD AUTO: 167 10(3)UL (ref 150–450)
POTASSIUM SERPL-SCNC: 4.2 MMOL/L (ref 3.6–5.1)
PSA SERPL DL<=0.01 NG/ML-MCNC: 15.2 SECONDS (ref 12.4–14.7)
Q-T INTERVAL: 390 MS
QRS DURATION: 90 MS
QTC CALCULATION (BEZET): 395 MS
R AXIS: -25 DEGREES
RBC # BLD AUTO: 5.47 X10(6)UL (ref 4.3–5.7)
RED CELL DISTRIBUTION WIDTH-SD: 42.2 FL (ref 35.1–46.3)
RH BLOOD TYPE: POSITIVE
RHEUMATOID FACT SERPL-ACNC: 13 IU/ML (ref ?–15)
SED RATE-ML: 19 MM/HR (ref 0–12)
SODIUM SERPL-SCNC: 142 MMOL/L (ref 136–144)
T AXIS: -21 DEGREES
T4 FREE SERPL-MCNC: 0.9 NG/DL (ref 0.9–1.8)
TRIGL SERPL-MCNC: 62 MG/DL (ref 30–149)
TSI SER-ACNC: 9.91 MIU/ML (ref 0.35–5.5)
URATE SERPL-MCNC: 5.7 MG/DL (ref 2.4–8.7)
VENTRICULAR RATE: 62 BPM
VLDLC SERPL CALC-MCNC: 12 MG/DL (ref 0–30)
WBC # BLD AUTO: 6 X10(3) UL (ref 4–13)

## 2018-08-30 PROCEDURE — 82043 UR ALBUMIN QUANTITATIVE: CPT

## 2018-08-30 PROCEDURE — 85610 PROTHROMBIN TIME: CPT

## 2018-08-30 PROCEDURE — 86140 C-REACTIVE PROTEIN: CPT

## 2018-08-30 PROCEDURE — 86225 DNA ANTIBODY NATIVE: CPT

## 2018-08-30 PROCEDURE — 83036 HEMOGLOBIN GLYCOSYLATED A1C: CPT

## 2018-08-30 PROCEDURE — 87081 CULTURE SCREEN ONLY: CPT

## 2018-08-30 PROCEDURE — 80053 COMPREHEN METABOLIC PANEL: CPT

## 2018-08-30 PROCEDURE — 36415 COLL VENOUS BLD VENIPUNCTURE: CPT

## 2018-08-30 PROCEDURE — 86038 ANTINUCLEAR ANTIBODIES: CPT

## 2018-08-30 PROCEDURE — 86900 BLOOD TYPING SEROLOGIC ABO: CPT

## 2018-08-30 PROCEDURE — 84439 ASSAY OF FREE THYROXINE: CPT

## 2018-08-30 PROCEDURE — 84443 ASSAY THYROID STIM HORMONE: CPT

## 2018-08-30 PROCEDURE — 86235 NUCLEAR ANTIGEN ANTIBODY: CPT

## 2018-08-30 PROCEDURE — 93005 ELECTROCARDIOGRAM TRACING: CPT

## 2018-08-30 PROCEDURE — 93010 ELECTROCARDIOGRAM REPORT: CPT | Performed by: INTERNAL MEDICINE

## 2018-08-30 PROCEDURE — 86200 CCP ANTIBODY: CPT

## 2018-08-30 PROCEDURE — 85730 THROMBOPLASTIN TIME PARTIAL: CPT

## 2018-08-30 PROCEDURE — 85652 RBC SED RATE AUTOMATED: CPT

## 2018-08-30 PROCEDURE — 85025 COMPLETE CBC W/AUTO DIFF WBC: CPT

## 2018-08-30 PROCEDURE — 82570 ASSAY OF URINE CREATININE: CPT

## 2018-08-30 PROCEDURE — 80061 LIPID PANEL: CPT

## 2018-08-30 PROCEDURE — 86431 RHEUMATOID FACTOR QUANT: CPT

## 2018-08-30 PROCEDURE — 86850 RBC ANTIBODY SCREEN: CPT

## 2018-08-30 PROCEDURE — 84550 ASSAY OF BLOOD/URIC ACID: CPT

## 2018-08-30 PROCEDURE — 86901 BLOOD TYPING SEROLOGIC RH(D): CPT

## 2018-09-01 LAB — CYCLIC CITRULLINATED PEPTIDE: 21 UNITS

## 2018-09-04 LAB
ANA SCREEN: POSITIVE
CENTROMERE AUTOAB: <100 AU/ML (ref ?–100)
DSDNA AUTOAB: <100 IU/ML (ref ?–100)
HISTONE AUTOAB: <100 AU/ML (ref ?–100)
JO-1 AUTOAB: <100 AU/ML (ref ?–100)
RNP AUTOAB: 130 AU/ML (ref ?–100)
SCL-70 AUTOAB: <100 AU/ML (ref ?–100)
SM AUTOAB (SMITH): <100 AU/ML (ref ?–100)
SSA AUTOAB: 263 AU/ML (ref ?–100)
SSB AUTOAB: <100 AU/ML (ref ?–100)

## 2018-09-06 RX ORDER — LISINOPRIL 20 MG/1
20 TABLET ORAL DAILY
Qty: 90 TABLET | Refills: 0 | Status: SHIPPED | OUTPATIENT
Start: 2018-09-06 | End: 2018-10-24

## 2018-09-06 NOTE — TELEPHONE ENCOUNTER
From: Savannah Pulido.   Sent: 9/5/2018 9:12 PM CDT  Subject: Medication Renewal Request    Savannah Pulido. would like a refill of the following medications:     lisinopril 20 MG Oral Tab Crista Delgado MD]    Preferred pharmacy: [facility]

## 2018-09-07 ENCOUNTER — OFFICE VISIT (OUTPATIENT)
Dept: INTERNAL MEDICINE CLINIC | Facility: CLINIC | Age: 63
End: 2018-09-07
Payer: COMMERCIAL

## 2018-09-07 VITALS
WEIGHT: 266 LBS | TEMPERATURE: 98 F | BODY MASS INDEX: 34.88 KG/M2 | DIASTOLIC BLOOD PRESSURE: 80 MMHG | HEIGHT: 73.25 IN | RESPIRATION RATE: 16 BRPM | HEART RATE: 60 BPM | SYSTOLIC BLOOD PRESSURE: 122 MMHG

## 2018-09-07 DIAGNOSIS — E66.09 CLASS 1 OBESITY DUE TO EXCESS CALORIES WITH SERIOUS COMORBIDITY AND BODY MASS INDEX (BMI) OF 34.0 TO 34.9 IN ADULT: ICD-10-CM

## 2018-09-07 DIAGNOSIS — Z01.818 PREOPERATIVE CLEARANCE: Primary | ICD-10-CM

## 2018-09-07 DIAGNOSIS — M16.12 PRIMARY OSTEOARTHRITIS OF LEFT HIP: ICD-10-CM

## 2018-09-07 DIAGNOSIS — R73.03 PREDIABETES: ICD-10-CM

## 2018-09-07 DIAGNOSIS — I10 ESSENTIAL HYPERTENSION: ICD-10-CM

## 2018-09-07 PROBLEM — E66.811 CLASS 1 OBESITY DUE TO EXCESS CALORIES WITH SERIOUS COMORBIDITY AND BODY MASS INDEX (BMI) OF 34.0 TO 34.9 IN ADULT: Status: ACTIVE | Noted: 2018-09-07

## 2018-09-07 PROCEDURE — 99243 OFF/OP CNSLTJ NEW/EST LOW 30: CPT | Performed by: INTERNAL MEDICINE

## 2018-09-07 NOTE — PROGRESS NOTES
Patient presents with:  Pre-Op Exam: L Hip replacement on 9-12-18 dr. Park Disla 81       HPI: Liz Fraga presents today for pre-op clearance. He is set to undergo Left anterior hip replacement on 9/12/18. His diagnosis is end-stage primary OA of the L hip.   His surgeo MOUTH BEFORE BREAKFAST, Disp: 90 tablet, Rfl: 3  •  mupirocin 2 % External Ointment, Use cotton swab to place a pea size amount of ointment. Apply topically to inside your nose twice daily. , Disp: 15 g, Rfl: 0  •  rivaroxaban (Eduardo Severs) 10 MG Oral Tab, Sharlette Aloe delay.  2. HTN - Stable on prescription medication. No new issues. 3. Prediabetes - Continue to work on lifestyle measures. 4. Obesity - Continue to work on lifestyle measures. 5. RTC in the post-op period.   Dar verbally agrees to and understands the

## 2018-09-12 ENCOUNTER — ANESTHESIA EVENT (OUTPATIENT)
Dept: SURGERY | Facility: HOSPITAL | Age: 63
End: 2018-09-12

## 2018-09-12 ENCOUNTER — HOSPITAL ENCOUNTER (OUTPATIENT)
Facility: HOSPITAL | Age: 63
Setting detail: HOSPITAL OUTPATIENT SURGERY
Discharge: HOME | End: 2018-09-12
Attending: ORTHOPAEDIC SURGERY | Admitting: ORTHOPAEDIC SURGERY
Payer: COMMERCIAL

## 2018-09-12 ENCOUNTER — ANESTHESIA (OUTPATIENT)
Dept: SURGERY | Facility: HOSPITAL | Age: 63
End: 2018-09-12

## 2018-09-12 ENCOUNTER — APPOINTMENT (OUTPATIENT)
Dept: GENERAL RADIOLOGY | Facility: HOSPITAL | Age: 63
End: 2018-09-12
Attending: ORTHOPAEDIC SURGERY
Payer: COMMERCIAL

## 2018-09-12 VITALS
WEIGHT: 267.44 LBS | BODY MASS INDEX: 35.44 KG/M2 | HEIGHT: 73 IN | RESPIRATION RATE: 18 BRPM | HEART RATE: 85 BPM | TEMPERATURE: 97 F | DIASTOLIC BLOOD PRESSURE: 80 MMHG | SYSTOLIC BLOOD PRESSURE: 148 MMHG | OXYGEN SATURATION: 97 %

## 2018-09-12 DIAGNOSIS — M16.12 PRIMARY OSTEOARTHRITIS OF LEFT HIP: Primary | ICD-10-CM

## 2018-09-12 PROCEDURE — 76942 ECHO GUIDE FOR BIOPSY: CPT | Performed by: ORTHOPAEDIC SURGERY

## 2018-09-12 PROCEDURE — 97530 THERAPEUTIC ACTIVITIES: CPT

## 2018-09-12 PROCEDURE — 97116 GAIT TRAINING THERAPY: CPT

## 2018-09-12 PROCEDURE — 97535 SELF CARE MNGMENT TRAINING: CPT

## 2018-09-12 PROCEDURE — 88311 DECALCIFY TISSUE: CPT | Performed by: ORTHOPAEDIC SURGERY

## 2018-09-12 PROCEDURE — 97165 OT EVAL LOW COMPLEX 30 MIN: CPT

## 2018-09-12 PROCEDURE — 88304 TISSUE EXAM BY PATHOLOGIST: CPT | Performed by: ORTHOPAEDIC SURGERY

## 2018-09-12 PROCEDURE — 76001 XR FLUOROSCOPE EXAM >1 HR EXTENSIVE (CPT=76001): CPT | Performed by: ORTHOPAEDIC SURGERY

## 2018-09-12 PROCEDURE — 0SRB0JZ REPLACEMENT OF LEFT HIP JOINT WITH SYNTHETIC SUBSTITUTE, OPEN APPROACH: ICD-10-PCS | Performed by: ORTHOPAEDIC SURGERY

## 2018-09-12 PROCEDURE — 97161 PT EVAL LOW COMPLEX 20 MIN: CPT

## 2018-09-12 DEVICE — PINNACLE POROCOAT ACETABULAR SHELL SECTOR II 56MM OD
Type: IMPLANTABLE DEVICE | Site: HIP | Status: FUNCTIONAL
Brand: PINNACLE POROCOAT

## 2018-09-12 DEVICE — CORAIL HIP SYSTEM CEMENTLESS FEMORAL STEM 12/14 AMT 125 DEGREES KLA SIZE 13 HA COATED HIGH OFFSET COLLAR
Type: IMPLANTABLE DEVICE | Site: HIP | Status: FUNCTIONAL
Brand: CORAIL

## 2018-09-12 DEVICE — BIOLOX DELTA CERAMIC FEMORAL HEAD +5.0 36MM DIA 12/14 TAPER
Type: IMPLANTABLE DEVICE | Site: HIP | Status: FUNCTIONAL
Brand: BIOLOX DELTA

## 2018-09-12 DEVICE — PINNACLE HIP SOLUTIONS ALTRX POLYETHYLENE ACETABULAR LINER NEUTRAL 36MM ID 56MM OD
Type: IMPLANTABLE DEVICE | Site: HIP | Status: FUNCTIONAL
Brand: PINNACLE ALTRX

## 2018-09-12 RX ORDER — HYDROCODONE BITARTRATE AND ACETAMINOPHEN 5; 325 MG/1; MG/1
1 TABLET ORAL AS NEEDED
Status: COMPLETED | OUTPATIENT
Start: 2018-09-12 | End: 2018-09-12

## 2018-09-12 RX ORDER — MORPHINE SULFATE 4 MG/ML
INJECTION, SOLUTION INTRAMUSCULAR; INTRAVENOUS
Status: COMPLETED
Start: 2018-09-12 | End: 2018-09-12

## 2018-09-12 RX ORDER — NALOXONE HYDROCHLORIDE 0.4 MG/ML
80 INJECTION, SOLUTION INTRAMUSCULAR; INTRAVENOUS; SUBCUTANEOUS AS NEEDED
Status: DISCONTINUED | OUTPATIENT
Start: 2018-09-12 | End: 2018-09-12

## 2018-09-12 RX ORDER — ONDANSETRON 2 MG/ML
4 INJECTION INTRAMUSCULAR; INTRAVENOUS AS NEEDED
Status: DISCONTINUED | OUTPATIENT
Start: 2018-09-12 | End: 2018-09-12

## 2018-09-12 RX ORDER — HYDROCODONE BITARTRATE AND ACETAMINOPHEN 5; 325 MG/1; MG/1
2 TABLET ORAL AS NEEDED
Status: COMPLETED | OUTPATIENT
Start: 2018-09-12 | End: 2018-09-12

## 2018-09-12 RX ORDER — CEFAZOLIN SODIUM/WATER 2 G/20 ML
2 SYRINGE (ML) INTRAVENOUS ONCE
Status: COMPLETED | OUTPATIENT
Start: 2018-09-12 | End: 2018-09-12

## 2018-09-12 RX ORDER — CELECOXIB 200 MG/1
200 CAPSULE ORAL DAILY
Qty: 30 CAPSULE | Refills: 0 | Status: SHIPPED | OUTPATIENT
Start: 2018-09-12 | End: 2019-11-21 | Stop reason: WASHOUT

## 2018-09-12 RX ORDER — OXYCODONE HCL 10 MG/1
10 TABLET, FILM COATED, EXTENDED RELEASE ORAL
Status: CANCELLED | OUTPATIENT
Start: 2018-09-13 | End: 2018-09-13

## 2018-09-12 RX ORDER — CEFAZOLIN SODIUM/WATER 2 G/20 ML
SYRINGE (ML) INTRAVENOUS
Status: DISCONTINUED
Start: 2018-09-12 | End: 2018-09-12

## 2018-09-12 RX ORDER — OXYCODONE HCL 10 MG/1
10 TABLET, FILM COATED, EXTENDED RELEASE ORAL
Status: COMPLETED | OUTPATIENT
Start: 2018-09-12 | End: 2018-09-12

## 2018-09-12 RX ORDER — ACETAMINOPHEN 325 MG/1
TABLET ORAL
Status: COMPLETED
Start: 2018-09-12 | End: 2018-09-12

## 2018-09-12 RX ORDER — SODIUM CHLORIDE, SODIUM LACTATE, POTASSIUM CHLORIDE, CALCIUM CHLORIDE 600; 310; 30; 20 MG/100ML; MG/100ML; MG/100ML; MG/100ML
INJECTION, SOLUTION INTRAVENOUS CONTINUOUS
Status: DISCONTINUED | OUTPATIENT
Start: 2018-09-12 | End: 2018-09-12

## 2018-09-12 RX ORDER — ACETAMINOPHEN 325 MG/1
650 TABLET ORAL ONCE
Status: COMPLETED | OUTPATIENT
Start: 2018-09-12 | End: 2018-09-12

## 2018-09-12 RX ORDER — ACETAMINOPHEN 500 MG
1000 TABLET ORAL ONCE
Status: DISCONTINUED | OUTPATIENT
Start: 2018-09-12 | End: 2018-09-12 | Stop reason: HOSPADM

## 2018-09-12 RX ORDER — CYCLOBENZAPRINE HCL 10 MG
10 TABLET ORAL 3 TIMES DAILY
Qty: 60 TABLET | Refills: 0 | Status: SHIPPED | OUTPATIENT
Start: 2018-09-12 | End: 2018-10-02

## 2018-09-12 RX ORDER — METOCLOPRAMIDE HYDROCHLORIDE 5 MG/ML
10 INJECTION INTRAMUSCULAR; INTRAVENOUS AS NEEDED
Status: DISCONTINUED | OUTPATIENT
Start: 2018-09-12 | End: 2018-09-12

## 2018-09-12 RX ORDER — SCOLOPAMINE TRANSDERMAL SYSTEM 1 MG/1
1 PATCH, EXTENDED RELEASE TRANSDERMAL ONCE
Status: DISCONTINUED | OUTPATIENT
Start: 2018-09-12 | End: 2018-09-12

## 2018-09-12 RX ORDER — MORPHINE SULFATE 4 MG/ML
2 INJECTION, SOLUTION INTRAMUSCULAR; INTRAVENOUS EVERY 5 MIN PRN
Status: DISCONTINUED | OUTPATIENT
Start: 2018-09-12 | End: 2018-09-12

## 2018-09-12 RX ORDER — SCOLOPAMINE TRANSDERMAL SYSTEM 1 MG/1
1 PATCH, EXTENDED RELEASE TRANSDERMAL ONCE
Status: CANCELLED | OUTPATIENT
Start: 2018-09-12 | End: 2018-09-12

## 2018-09-12 RX ORDER — CEFAZOLIN SODIUM/WATER 2 G/20 ML
2 SYRINGE (ML) INTRAVENOUS EVERY 8 HOURS
Status: DISCONTINUED | OUTPATIENT
Start: 2018-09-12 | End: 2018-09-12

## 2018-09-12 NOTE — OPERATIVE REPORT
TOTAL HIP REPLACEMENT OPERATIVE REPORT    Melina Virgilio.       [id number]     9/21/1955    PRE-OP DX:  LEFT HIP PRIMARY OSTEOARTHRITIS  POST-OP DX:  LEFT HIP PRIMARY OSTEOARTHRITIS  PROCEDURE:  DIRECT ANTERIOR LEFT TOTAL HIP REPLACEMENT  SURGEON:  RAE OSTEOTOMY WAS MADE. FEMORAL HEAD WAS REMOVED WITH A CORK SCREW. POSTERIOR AND INFERIOR ACETABULAR RETRACTORS WERE PLACED CAREFULLY. GOOD ACETABULAR EXPOSURE WAS OBTAINED. LABRAL TISSUE WAS EXCISED.   MEDIAL WALL WAS IDENTIFIED AND CLEARED OF SOFT TIS BE STABLE WITH GOOD TENSION. ALL THE TRIAL IMPLANTS WERE REMOVED. WOUND WAS IRRIGATED COPIOUSLY. HEMOSTASIS WAS OBTAINED. ACETABULUM WAS REEXPOSED. REAL LINER WAS IMPACTED. ITS SEATING WAS VERIFIED. PROXIMAL FEMUR WAS EXPOSED.   REAL FEMORAL STEM WAS

## 2018-09-12 NOTE — H&P
Alee Ludwig.   9/7/2018 11:00 AM   Office Visit   MRN:  [id number]   Description: 65year old male Provider: Leia Mcardle, MD Department: [facility] [address]   Scanning Cover Sheet     Click to print Neymar Circe 852 for scanning   [facility] Comment: R ROSE by Dr. Xi Adams done @ [facility]  No date: KNEE SURGERY Bilateral      Comment: numerous surgeries to both knees-arthroscopic                and reconstruction type  03/29/2018: LAPAROSCOPIC CHOLECYSTECTOMY  No date: OTHER      C Reviewed and per the scanned EHR. Appropriate, pertinent pre-operative testing was within normal limits.   I have nothing else to add at this time.        A/P:  Preoperative clearance  (primary encounter diagnosis)  Primary osteoarthritis of left hip  Donna Electronically signed by Blair Santana MD on 9/7/18 at 11:43 AM   Encounter Reviewed By     Please see PCP note. +hip pain.   No major changes  BP (!) 153/95 (BP Location: Right arm)   Pulse 62   Temp 98.2 °F (36.8 °C) (Temporal)   Resp 17   Ht 6' 1\" (1.85

## 2018-09-12 NOTE — PROGRESS NOTES
Level of pain is 3/10. Left thigh is still little numb. No CP. No HA. No SOB. /73   Pulse 56   Temp 97.4 °F (36.3 °C)   Resp 18   Ht 6' 1\" (1.854 m)   Wt 267 lb 6.7 oz (121.3 kg)   SpO2 97%   BMI 35.28 kg/m²      Awake. Alert. Wife present.

## 2018-09-12 NOTE — CM/SW NOTE
Informed by [facility] [phone] that agency can accept for RN/PT services. Liaison aware of labs ordered for tomorrow at home. MALIK spoke with RN- Elmer Avila, re: above. Requested that lab order be included in the AVS for the [address].   Also spoke

## 2018-09-12 NOTE — ANESTHESIA PREPROCEDURE EVALUATION
PRE-OP EVALUATION    Patient Name: Wood Cardona.     Pre-op Diagnosis: Primary osteoarthritis of left hip [M16.12]    Procedure(s):  LEFT ANTERIOR TOTAL HIP REPLACEMENT    Surgeon(s) and Role:     Jared Connell MD - Primary    Pre-op vitals review Comment:  umbilical hernia repair  06/2013: HIP REPLACEMENT SURGERY;  Right      Comment:  R ROSE by Dr. Noe Rose done @ [facility]  No date: KNEE SURGERY; Bilateral      Comment:  numerous surgeries to both knees-arthroscopic and                recon

## 2018-09-12 NOTE — PHYSICAL THERAPY NOTE
PHYSICAL THERAPY HIP EVALUATION - INPATIENT       Evaluation Date: 9/12/2018    Physician Order: PT Eval and Treat    Presenting Problem: S/p Direct Anterior Left ROSE on 09/12/18  Reason for Therapy: Mobility Dysfunction and Discharge Planning    History r Tolerated    PAIN ASSESSMENT  Ratin  Location: Left hip @ surgical site  Management Techniques: Activity promotion; Body mechanics;Breathing techniques;Relaxation;Repositioning    COGNITION  · Overall Cognitive Status:  WFL - within functional limits Therapy Provided: Evaluation completed. Patient was instructed & educated in post surgical precautions & weight bearing status. Issued handouts for precautions & reviewed multiple times during this session.  Patient was able to participate with Left LE exerci following hip replacement and is appropriate for discharge home when medically stable.     DISCHARGE RECOMMENDATIONS  PT Discharge Recommendations: Home with home health PTThe AM-PAC '6-Clicks' Inpatient Basic Mobility Short Form was completed and this tramaine

## 2018-09-12 NOTE — CM/SW NOTE
Call from [facility], Randell Villanueva, re: pt expected to d/c today and order for RN/PT with H/H check for tomorrow. Referral called to [facility] [phone]. Agency will check to see if pt's BCBS plan is in network for agency. Awaiting response.     Unable to speak

## 2018-09-12 NOTE — OCCUPATIONAL THERAPY NOTE
OCCUPATIONAL THERAPY QUICK EVALUATION - INPATIENT    Room Number: [facility] PRE ASCC/ PRE ASCC Pool  Evaluation Date: 9/12/2018     Type of Evaluation: Quick Eval  Presenting Problem: (L ROSE)    Physician Order: IP Consult to Occupational Therapy  Reason for Th 3  Location: (l hip)  Management Techniques: Relaxation;Repositioning    COGNITION  wfl    RANGE OF MOTION AND STRENGTH ASSESSMENT  Upper extremity ROM is within functional limits     Upper extremity strength is within functional limits       NEUROLOGICAL Complete medical history and occupational profile noted above. Functional outcome measures completed include AMPAC.   In this OT evaluation patient presents with the following performance deficits: decreased balance, L hip stiffness and pain, anterior hip p

## 2018-09-13 ENCOUNTER — TELEPHONE (OUTPATIENT)
Dept: INTERNAL MEDICINE CLINIC | Facility: CLINIC | Age: 63
End: 2018-09-13

## 2018-09-13 NOTE — TELEPHONE ENCOUNTER
Patient calling for post op exam only with Dr Gwendlyn Jeans; offered other providers and he declined. Asked for nurse callback to see if Dr Gwendlyn Jeans would offer him a time within next day or beginning of next week.

## 2018-09-14 ENCOUNTER — LAB REQUISITION (OUTPATIENT)
Dept: LAB | Facility: HOSPITAL | Age: 63
End: 2018-09-14
Payer: COMMERCIAL

## 2018-09-14 DIAGNOSIS — R69 ILLNESS: ICD-10-CM

## 2018-09-14 LAB
BASOPHILS # BLD AUTO: 0.04 X10(3) UL (ref 0–0.1)
BASOPHILS NFR BLD AUTO: 0.6 %
EOSINOPHIL # BLD AUTO: 0.25 X10(3) UL (ref 0–0.3)
EOSINOPHIL NFR BLD AUTO: 3.5 %
ERYTHROCYTE [DISTWIDTH] IN BLOOD BY AUTOMATED COUNT: 14.4 % (ref 11.5–16)
HCT VFR BLD AUTO: 40.6 % (ref 37–53)
HGB BLD-MCNC: 13.1 G/DL (ref 13–17)
IMMATURE GRANULOCYTE COUNT: 0.03 X10(3) UL (ref 0–1)
IMMATURE GRANULOCYTE RATIO %: 0.4 %
LYMPHOCYTES # BLD AUTO: 1.51 X10(3) UL (ref 0.9–4)
LYMPHOCYTES NFR BLD AUTO: 21.1 %
MCH RBC QN AUTO: 27.2 PG (ref 27–33.2)
MCHC RBC AUTO-ENTMCNC: 32.3 G/DL (ref 31–37)
MCV RBC AUTO: 84.4 FL (ref 80–99)
MONOCYTES # BLD AUTO: 0.45 X10(3) UL (ref 0.1–1)
MONOCYTES NFR BLD AUTO: 6.3 %
NEUTROPHIL ABS PRELIM: 4.86 X10 (3) UL (ref 1.3–6.7)
NEUTROPHILS # BLD AUTO: 4.86 X10(3) UL (ref 1.3–6.7)
NEUTROPHILS NFR BLD AUTO: 68.1 %
PLATELET # BLD AUTO: 130 10(3)UL (ref 150–450)
RBC # BLD AUTO: 4.81 X10(6)UL (ref 4.3–5.7)
RED CELL DISTRIBUTION WIDTH-SD: 43.9 FL (ref 35.1–46.3)
WBC # BLD AUTO: 7.1 X10(3) UL (ref 4–13)

## 2018-09-14 PROCEDURE — 85025 COMPLETE CBC W/AUTO DIFF WBC: CPT | Performed by: INTERNAL MEDICINE

## 2018-09-21 ENCOUNTER — OFFICE VISIT (OUTPATIENT)
Dept: INTERNAL MEDICINE CLINIC | Facility: CLINIC | Age: 63
End: 2018-09-21
Payer: COMMERCIAL

## 2018-09-21 VITALS
DIASTOLIC BLOOD PRESSURE: 76 MMHG | TEMPERATURE: 98 F | HEART RATE: 85 BPM | WEIGHT: 268.75 LBS | BODY MASS INDEX: 35 KG/M2 | SYSTOLIC BLOOD PRESSURE: 106 MMHG | OXYGEN SATURATION: 97 % | RESPIRATION RATE: 16 BRPM

## 2018-09-21 DIAGNOSIS — Z96.642 STATUS POST LEFT HIP REPLACEMENT: ICD-10-CM

## 2018-09-21 DIAGNOSIS — M16.12 PRIMARY OSTEOARTHRITIS OF LEFT HIP: Primary | ICD-10-CM

## 2018-09-21 DIAGNOSIS — Z79.01 ANTICOAGULATED: ICD-10-CM

## 2018-09-21 PROCEDURE — 99214 OFFICE O/P EST MOD 30 MIN: CPT | Performed by: INTERNAL MEDICINE

## 2018-09-21 RX ORDER — HYDROCODONE BITARTRATE AND ACETAMINOPHEN 10; 325 MG/1; MG/1
1-2 TABLET ORAL EVERY 6 HOURS PRN
COMMUNITY
End: 2019-12-03 | Stop reason: ALTCHOICE

## 2018-09-21 NOTE — PROGRESS NOTES
HPI:    Loretta Calderon. is a 59year old male here today for hospital follow up.    He was discharged from [facility] to [address] [place] [address] Date: 9/12/18  Discharge Date: same  Hospital Discharge Diagnosis:    End-stage L hip primary OA s/p L hip repl surgery. Do not use with other blood thinners and anti-inflammatory medicine. Levothyroxine Sodium 75 MCG Oral Tab TAKE 1 TABLET(75 MCG) BY MOUTH BEFORE BREAKFAST     No current facility-administered medications on file prior to visit.        HISTORY: rec in no apparent distress, obese  SKIN: no rashes, no suspicious lesions  LUNGS: clear to auscultation  CARDIO: RRR without murmur  GI: good BS's, no masses, HSM or tenderness  MUSCULOSKELETAL: limited ROM at the L hip; uses walker to ambulate  EXTREMITIES:

## 2018-10-01 PROBLEM — M25.552 ACUTE PAIN OF LEFT HIP: Status: ACTIVE | Noted: 2018-10-01

## 2018-10-01 PROBLEM — R29.898 DECREASED STRENGTH OF LOWER EXTREMITY: Status: ACTIVE | Noted: 2018-10-01

## 2018-10-24 RX ORDER — LISINOPRIL 20 MG/1
20 TABLET ORAL DAILY
Qty: 90 TABLET | Refills: 0 | Status: SHIPPED | OUTPATIENT
Start: 2018-10-24 | End: 2019-02-04

## 2018-10-24 NOTE — TELEPHONE ENCOUNTER
Refill requested:   Requested Prescriptions     Pending Prescriptions Disp Refills   • lisinopril 20 MG Oral Tab 90 tablet 0     Sig: Take 1 tablet (20 mg total) by mouth daily.        Passed protocol    Last refill: 9/6/2018 #90 NR    Blood Pressure: BP Re

## 2019-02-05 RX ORDER — LISINOPRIL 20 MG/1
TABLET ORAL
Qty: 90 TABLET | Refills: 0 | Status: SHIPPED | OUTPATIENT
Start: 2019-02-05 | End: 2019-07-07

## 2019-02-05 NOTE — TELEPHONE ENCOUNTER
Lisinopril 20 mg 1 tab daily filled 10-24-18 90 with 0 refills    LOV 9-7-18     . HTN - Stable on prescription medication. No new issues.     No upcoming apt on file     Labs 8-30-18

## 2019-02-11 RX ORDER — LEVOTHYROXINE SODIUM 0.07 MG/1
TABLET ORAL
Qty: 90 TABLET | Refills: 0 | Status: SHIPPED | OUTPATIENT
Start: 2019-02-11 | End: 2019-05-12

## 2019-02-11 NOTE — TELEPHONE ENCOUNTER
Levothyroxine 75 mcg 1 tab daily filled 1-29-18 90 with 3 refills     LOV 1-29-18      Hypothyroidism 2/2 Hashimoto's thyroiditis - Stable on prescription medication. Due for labs. RTC 1 year or PRN.     No upcoming apt on file     Labs 8-30-18     Thyr

## 2019-05-13 RX ORDER — LEVOTHYROXINE SODIUM 0.07 MG/1
TABLET ORAL
Qty: 90 TABLET | Refills: 0 | Status: SHIPPED | OUTPATIENT
Start: 2019-05-13 | End: 2019-08-14

## 2019-05-13 NOTE — TELEPHONE ENCOUNTER
Failed protocol     Last refill:  2/11/2019 Levothyroxine 75 mcg #90 NR    Last TSH 8/30/2018     LOV:  9/21/2018 Dr Odette Arceo RTC 6 months     FOV  Not scheduled

## 2019-07-07 DIAGNOSIS — N52.03 COMBINED ARTERIAL INSUFFICIENCY AND CORPORO-VENOUS OCCLUSIVE ERECTILE DYSFUNCTION: ICD-10-CM

## 2019-07-08 RX ORDER — SILDENAFIL 100 MG/1
100 TABLET, FILM COATED ORAL
Qty: 8 TABLET | Refills: 3 | OUTPATIENT
Start: 2019-07-08

## 2019-07-08 NOTE — TELEPHONE ENCOUNTER
Sildenafil 100 mg 1 tab prn filled 5-8-19 8 tab with 3 refills   Lisinopril 20 mg 1 tab daily filled 2-5-19 90 with 0 refills     LOV 9-21-18    RTC 6 months.    No upcoming apt on file   Labs 8-30-18

## 2019-07-09 RX ORDER — SILDENAFIL 100 MG/1
100 TABLET, FILM COATED ORAL
Qty: 8 TABLET | Refills: 3 | Status: SHIPPED | OUTPATIENT
Start: 2019-07-09 | End: 2019-09-13

## 2019-07-09 RX ORDER — LISINOPRIL 20 MG/1
20 TABLET ORAL DAILY
Qty: 30 TABLET | Refills: 2 | Status: SHIPPED | OUTPATIENT
Start: 2019-07-09 | End: 2019-09-10

## 2019-08-14 NOTE — TELEPHONE ENCOUNTER
Levothyroxine 75 mcg 1 tab daily filled 5-13-19 90 with 0 refills     LOV 9-21-18    RTC 6 months. No upcoming apt on file   Labs 8-30-18   2.  Thyroid is being under-stimulated.  We'll need to increase the strength of your Levothyroxine from 75 mcg to 10

## 2019-08-17 RX ORDER — LEVOTHYROXINE SODIUM 0.07 MG/1
TABLET ORAL
Qty: 90 TABLET | Refills: 0 | Status: SHIPPED | OUTPATIENT
Start: 2019-08-17 | End: 2019-10-30 | Stop reason: DRUGHIGH

## 2019-09-03 DIAGNOSIS — N52.03 COMBINED ARTERIAL INSUFFICIENCY AND CORPORO-VENOUS OCCLUSIVE ERECTILE DYSFUNCTION: ICD-10-CM

## 2019-09-03 RX ORDER — SILDENAFIL 100 MG/1
100 TABLET, FILM COATED ORAL
Qty: 8 TABLET | Refills: 3 | OUTPATIENT
Start: 2019-09-03

## 2019-09-11 NOTE — TELEPHONE ENCOUNTER
Last OV: 9/21/18 with Dr. Rockne Najjar  Last refill date: 7/9/19     #/refills: #30, 2 refills  When pt was asked to return for OV: 6 months  Upcoming appt/reason: no upcoming appt  Last labs 9/14/18 (CBC)

## 2019-09-12 ENCOUNTER — PATIENT MESSAGE (OUTPATIENT)
Dept: INTERNAL MEDICINE CLINIC | Facility: CLINIC | Age: 64
End: 2019-09-12

## 2019-09-12 DIAGNOSIS — N52.03 COMBINED ARTERIAL INSUFFICIENCY AND CORPORO-VENOUS OCCLUSIVE ERECTILE DYSFUNCTION: ICD-10-CM

## 2019-09-12 RX ORDER — LISINOPRIL 20 MG/1
TABLET ORAL
Qty: 30 TABLET | Refills: 0 | Status: SHIPPED | OUTPATIENT
Start: 2019-09-12 | End: 2019-10-13

## 2019-09-12 NOTE — TELEPHONE ENCOUNTER
From: Hien Ziegler. To: Silver Matta MD  Sent: 9/12/2019 7:29 AM CDT  Subject: Non-Urgent Medical Question    Hi Doc,  Asking for a Viagra prescription. I was denied through [address] [place] [address] 95. Could you help me with this. Dar.

## 2019-09-12 NOTE — PROGRESS NOTES
Failed protocol     Last refill:  7/9/2019 Sildenafil 100 mg #8 3R (Due for refill 10/2019)        LOV:   9/21/2018 Dr Ehsan Medina RTC 6 months   No FOV scheduled

## 2019-09-13 RX ORDER — SILDENAFIL 100 MG/1
100 TABLET, FILM COATED ORAL
Qty: 8 TABLET | Refills: 3 | Status: SHIPPED | OUTPATIENT
Start: 2019-09-13 | End: 2019-12-03

## 2019-10-14 NOTE — TELEPHONE ENCOUNTER
LMTCB x 1     Due for OV    Failed protocol     Last refill:  9/12/2019 Lisinopril 20 mg #30 NR    LOV:   9/21/2018 Dr Dona Johnson RTC 6 months    No FOV scheduled

## 2019-10-15 RX ORDER — LISINOPRIL 20 MG/1
TABLET ORAL
Qty: 30 TABLET | Refills: 5 | Status: SHIPPED | OUTPATIENT
Start: 2019-10-15 | End: 2020-05-06

## 2019-10-23 ENCOUNTER — TELEPHONE (OUTPATIENT)
Dept: INTERNAL MEDICINE CLINIC | Facility: CLINIC | Age: 64
End: 2019-10-23

## 2019-10-23 DIAGNOSIS — E06.3 HYPOTHYROIDISM DUE TO HASHIMOTO'S THYROIDITIS: ICD-10-CM

## 2019-10-23 DIAGNOSIS — R73.03 PREDIABETES: ICD-10-CM

## 2019-10-23 DIAGNOSIS — E03.8 HYPOTHYROIDISM DUE TO HASHIMOTO'S THYROIDITIS: ICD-10-CM

## 2019-10-23 DIAGNOSIS — I10 ESSENTIAL HYPERTENSION: ICD-10-CM

## 2019-10-23 DIAGNOSIS — Z00.00 ROUTINE GENERAL MEDICAL EXAMINATION AT A HEALTH CARE FACILITY: Primary | ICD-10-CM

## 2019-10-23 DIAGNOSIS — R97.20 ELEVATED PSA: ICD-10-CM

## 2019-10-23 NOTE — TELEPHONE ENCOUNTER
Patient called in to request orders for blood work to be done prior to his CPX on 12/03/19. Please call patient once orders are put in so he can schedule his appointment with the lab.

## 2019-10-23 NOTE — TELEPHONE ENCOUNTER
Labs pended. Adjust accordingly. PSA screening pended. Not sure if you want to change order to psa diagnostic.

## 2019-10-23 NOTE — TELEPHONE ENCOUNTER
Orders signed. Tonya Espinal. Roslyn Alamo MD  Diplomate, American Board of Internal Medicine  Member, American College of [address] [facility]  [address] [facility], [address], [facility], [address] [place] [address]  T: [phone]; F: [address]

## 2019-10-25 ENCOUNTER — LAB ENCOUNTER (OUTPATIENT)
Dept: LAB | Age: 64
End: 2019-10-25
Attending: INTERNAL MEDICINE
Payer: COMMERCIAL

## 2019-10-25 DIAGNOSIS — E03.8 HYPOTHYROIDISM DUE TO HASHIMOTO'S THYROIDITIS: ICD-10-CM

## 2019-10-25 DIAGNOSIS — R73.03 PREDIABETES: ICD-10-CM

## 2019-10-25 DIAGNOSIS — E06.3 HYPOTHYROIDISM DUE TO HASHIMOTO'S THYROIDITIS: ICD-10-CM

## 2019-10-25 DIAGNOSIS — R97.20 ELEVATED PSA: ICD-10-CM

## 2019-10-25 DIAGNOSIS — I10 ESSENTIAL HYPERTENSION: ICD-10-CM

## 2019-10-25 DIAGNOSIS — Z00.00 ROUTINE GENERAL MEDICAL EXAMINATION AT A HEALTH CARE FACILITY: ICD-10-CM

## 2019-10-25 PROCEDURE — 84153 ASSAY OF PSA TOTAL: CPT | Performed by: INTERNAL MEDICINE

## 2019-10-25 PROCEDURE — 80061 LIPID PANEL: CPT | Performed by: INTERNAL MEDICINE

## 2019-10-25 PROCEDURE — 82570 ASSAY OF URINE CREATININE: CPT | Performed by: INTERNAL MEDICINE

## 2019-10-25 PROCEDURE — 80050 GENERAL HEALTH PANEL: CPT | Performed by: INTERNAL MEDICINE

## 2019-10-25 PROCEDURE — 83036 HEMOGLOBIN GLYCOSYLATED A1C: CPT | Performed by: INTERNAL MEDICINE

## 2019-10-25 PROCEDURE — 84439 ASSAY OF FREE THYROXINE: CPT | Performed by: INTERNAL MEDICINE

## 2019-10-25 PROCEDURE — 82043 UR ALBUMIN QUANTITATIVE: CPT | Performed by: INTERNAL MEDICINE

## 2019-10-25 PROCEDURE — 81001 URINALYSIS AUTO W/SCOPE: CPT | Performed by: INTERNAL MEDICINE

## 2019-10-25 PROCEDURE — 36415 COLL VENOUS BLD VENIPUNCTURE: CPT | Performed by: INTERNAL MEDICINE

## 2019-10-30 ENCOUNTER — TELEPHONE (OUTPATIENT)
Dept: INTERNAL MEDICINE CLINIC | Facility: CLINIC | Age: 64
End: 2019-10-30

## 2019-10-30 DIAGNOSIS — E06.3 HYPOTHYROIDISM DUE TO HASHIMOTO'S THYROIDITIS: Primary | ICD-10-CM

## 2019-10-30 DIAGNOSIS — E03.8 HYPOTHYROIDISM DUE TO HASHIMOTO'S THYROIDITIS: Primary | ICD-10-CM

## 2019-10-30 RX ORDER — LEVOTHYROXINE SODIUM 0.1 MG/1
100 TABLET ORAL
Qty: 90 TABLET | Refills: 0 | Status: SHIPPED | OUTPATIENT
Start: 2019-10-30 | End: 2019-12-19 | Stop reason: DRUGHIGH

## 2019-11-11 ENCOUNTER — OFFICE VISIT (OUTPATIENT)
Dept: SURGERY | Facility: CLINIC | Age: 64
End: 2019-11-11
Payer: COMMERCIAL

## 2019-11-11 DIAGNOSIS — R97.20 ELEVATED PSA: ICD-10-CM

## 2019-11-11 DIAGNOSIS — N40.2 PROSTATE NODULE: ICD-10-CM

## 2019-11-11 DIAGNOSIS — R82.90 URINE FINDING: Primary | ICD-10-CM

## 2019-11-11 PROCEDURE — 81003 URINALYSIS AUTO W/O SCOPE: CPT | Performed by: UROLOGY

## 2019-11-11 PROCEDURE — 99243 OFF/OP CNSLTJ NEW/EST LOW 30: CPT | Performed by: UROLOGY

## 2019-11-11 RX ORDER — CIPROFLOXACIN 500 MG/1
TABLET, FILM COATED ORAL
Qty: 6 TABLET | Refills: 0 | OUTPATIENT
Start: 2019-11-11

## 2019-11-11 RX ORDER — CEFDINIR 300 MG/1
CAPSULE ORAL
Qty: 6 CAPSULE | Refills: 0 | OUTPATIENT
Start: 2019-11-11

## 2019-11-11 RX ORDER — LEVOTHYROXINE SODIUM 0.07 MG/1
TABLET ORAL
Qty: 90 TABLET | Refills: 0 | OUTPATIENT
Start: 2019-11-11

## 2019-11-11 NOTE — PROGRESS NOTES
Rooming Clinician:     Hien Ziegler. is a 62year old male. Patient presents with:  elevated psa: Referred by pcp for a prostate check  Elevated PSA:  Current PSA: 14. 60High 10-25-19   PSA History: 13. 80High  8/30-18,11. 80High  11-4-2016   Free P Right 6/24/2013    Performed by Noé Wilhelm MD at [facility] MAIN OR   • KNEE SURGERY Bilateral     numerous surgeries to both knees-arthroscopic and reconstruction type   • LAPAROSCOPIC CHOLECYSTECTOMY  03/29/2018   • LAPAROSCOPIC CHOLECYSTECTOMY N/A 3/29/2018 103 (H) 10/25/2019    CA 9.6 10/25/2019    ALB 4.0 10/25/2019    ALKPHO 98 10/25/2019    AST 49 (H) 10/25/2019     (H) 10/25/2019    PTT 31.1 08/30/2018    INR 1.15 (H) 08/30/2018    PT 14.4 06/24/2013         PSA:  No results found for: PSA    PSA

## 2019-11-11 NOTE — TELEPHONE ENCOUNTER
Levothyroxine 75 mcg 1 tab daily filled 10-30-19 90 with 0 refills     LOV 9-21-18    RTC 6 months  Next apt 12-3-19   Labs 10-25-19

## 2019-11-13 ENCOUNTER — TELEPHONE (OUTPATIENT)
Dept: SURGERY | Facility: CLINIC | Age: 64
End: 2019-11-13

## 2019-11-13 NOTE — TELEPHONE ENCOUNTER
Pt. states that he is getting a Prostate Biopsy done on thurs. 11/14/19, and that his pharm has not received the 2 Rx's from our office that he needs to take before his proc. Fabrice Marks

## 2019-11-13 NOTE — TELEPHONE ENCOUNTER
Called and LVM to call us back regarding antibiotics that were not called in to pharmacy on file.  Patient has prostate biopsy scheduled with Dr. Mey William on 11/14/19 at 9:30 am.

## 2019-11-13 NOTE — TELEPHONE ENCOUNTER
Called patients cell phone#, verified name and date of birth. Informed patient that we apologize for not call in antibiotics to pharmacy on file. Informed patient that unfortunately we will need to reschedule prostate biopsy.  Patient was very understanding

## 2019-11-14 RX ORDER — CIPROFLOXACIN 500 MG/1
500 TABLET, FILM COATED ORAL 2 TIMES DAILY
Qty: 6 TABLET | Refills: 0 | Status: SHIPPED | OUTPATIENT
Start: 2019-11-14 | End: 2019-12-03

## 2019-11-14 RX ORDER — CEFDINIR 300 MG/1
300 CAPSULE ORAL EVERY 12 HOURS
Qty: 6 CAPSULE | Refills: 0 | Status: SHIPPED | OUTPATIENT
Start: 2019-11-14 | End: 2019-11-17

## 2019-11-15 ENCOUNTER — TELEPHONE (OUTPATIENT)
Dept: SURGERY | Facility: CLINIC | Age: 64
End: 2019-11-15

## 2019-11-15 RX ORDER — SULFAMETHOXAZOLE AND TRIMETHOPRIM 800; 160 MG/1; MG/1
TABLET ORAL
Qty: 6 TABLET | Refills: 1 | Status: SHIPPED | OUTPATIENT
Start: 2019-11-15 | End: 2019-12-03 | Stop reason: ALTCHOICE

## 2019-11-15 NOTE — TELEPHONE ENCOUNTER
Patient has been rescheduled for 11-21-19 for his prostate biopsy. He expresses concern about taking pre procedure Cipro due to the side effects. Dr Petr Hicks please give new rx.

## 2019-11-15 NOTE — TELEPHONE ENCOUNTER
Left detailed message on identified VM informing patient that Dr Lurdes Paz changed his pre op antibiotic to Bactrim DS and was sent to his pharmacy.

## 2019-11-19 ENCOUNTER — TELEPHONE (OUTPATIENT)
Dept: SURGERY | Facility: CLINIC | Age: 64
End: 2019-11-19

## 2019-11-19 NOTE — TELEPHONE ENCOUNTER
Spoke with patient. He wanted to know if he should take the Bactrim with the Cefdiner pre op to the prostate biopsy. I informed him that he should take the Bactrim in place of the Cipro with the [address]. Verbalized understanding.

## 2019-11-20 NOTE — TELEPHONE ENCOUNTER
Patient scheduled for prostate biopsy on 11/21/19 with Dr. Michael Sanchez.       NO FURTHER ACTION REQUIRED>     Future Appointments   Date Time Provider Tyrese Alvarez   11/21/2019  8:30 AM Yaa Mcbride MD [facility] 4   12/3/2019  2:30 PM Mary Caputo

## 2019-11-21 ENCOUNTER — OFFICE VISIT (OUTPATIENT)
Dept: SURGERY | Facility: CLINIC | Age: 64
End: 2019-11-21
Payer: COMMERCIAL

## 2019-11-21 VITALS — TEMPERATURE: 98 F | HEART RATE: 62 BPM | SYSTOLIC BLOOD PRESSURE: 146 MMHG | DIASTOLIC BLOOD PRESSURE: 86 MMHG

## 2019-11-21 DIAGNOSIS — N40.2 PROSTATE NODULE: ICD-10-CM

## 2019-11-21 DIAGNOSIS — R97.20 ELEVATED PSA: Primary | ICD-10-CM

## 2019-11-21 PROCEDURE — 76872 US TRANSRECTAL: CPT | Performed by: UROLOGY

## 2019-11-21 PROCEDURE — 76942 ECHO GUIDE FOR BIOPSY: CPT | Performed by: UROLOGY

## 2019-11-21 PROCEDURE — 55700 BIOPSY OF PROSTATE,NEEDLE/PUNCH: CPT | Performed by: UROLOGY

## 2019-11-21 NOTE — PROGRESS NOTES
PSA is 14    MARIA DEL CARMEN shows a nodule on the left side      Informed consent was obtained. The patient was brought into the treatment room and then placed on the examining table in left lateral decubitus position.   A  digital rectal examination was performed and

## 2019-11-26 ENCOUNTER — TELEPHONE (OUTPATIENT)
Dept: SURGERY | Facility: CLINIC | Age: 64
End: 2019-11-26

## 2019-11-27 NOTE — TELEPHONE ENCOUNTER
And to report prostate needle biopsy which shows cancer in both lobes. Briefly discussed the report and treatment options and suggested that he schedule a follow-up in the office in about 1 week to review this. I also recommended [employer] org in [facility] webs

## 2019-11-27 NOTE — TELEPHONE ENCOUNTER
Tried to reach patient to schedule a post prostate biopsy discussion. LM to call back on identified VM.

## 2019-12-03 ENCOUNTER — APPOINTMENT (OUTPATIENT)
Dept: LAB | Age: 64
End: 2019-12-03
Attending: INTERNAL MEDICINE
Payer: COMMERCIAL

## 2019-12-03 ENCOUNTER — OFFICE VISIT (OUTPATIENT)
Dept: INTERNAL MEDICINE CLINIC | Facility: CLINIC | Age: 64
End: 2019-12-03
Payer: COMMERCIAL

## 2019-12-03 VITALS
TEMPERATURE: 99 F | WEIGHT: 270.75 LBS | HEART RATE: 68 BPM | BODY MASS INDEX: 35.88 KG/M2 | OXYGEN SATURATION: 98 % | SYSTOLIC BLOOD PRESSURE: 138 MMHG | RESPIRATION RATE: 16 BRPM | HEIGHT: 73 IN | DIASTOLIC BLOOD PRESSURE: 78 MMHG

## 2019-12-03 DIAGNOSIS — E06.3 HYPOTHYROIDISM DUE TO HASHIMOTO'S THYROIDITIS: ICD-10-CM

## 2019-12-03 DIAGNOSIS — Z00.00 ROUTINE GENERAL MEDICAL EXAMINATION AT A HEALTH CARE FACILITY: Primary | ICD-10-CM

## 2019-12-03 DIAGNOSIS — C61 PROSTATE CANCER (HCC): ICD-10-CM

## 2019-12-03 DIAGNOSIS — E66.01 CLASS 2 SEVERE OBESITY DUE TO EXCESS CALORIES WITH SERIOUS COMORBIDITY AND BODY MASS INDEX (BMI) OF 35.0 TO 35.9 IN ADULT (HCC): ICD-10-CM

## 2019-12-03 DIAGNOSIS — R73.03 PREDIABETES: ICD-10-CM

## 2019-12-03 DIAGNOSIS — E03.8 HYPOTHYROIDISM DUE TO HASHIMOTO'S THYROIDITIS: ICD-10-CM

## 2019-12-03 DIAGNOSIS — I10 ESSENTIAL HYPERTENSION: ICD-10-CM

## 2019-12-03 PROBLEM — M16.12 PRIMARY OSTEOARTHRITIS OF LEFT HIP: Status: RESOLVED | Noted: 2018-01-31 | Resolved: 2019-12-03

## 2019-12-03 PROBLEM — R29.898 DECREASED STRENGTH OF LOWER EXTREMITY: Status: RESOLVED | Noted: 2018-10-01 | Resolved: 2019-12-03

## 2019-12-03 PROBLEM — Z96.642 STATUS POST LEFT HIP REPLACEMENT: Status: RESOLVED | Noted: 2018-09-21 | Resolved: 2019-12-03

## 2019-12-03 PROBLEM — M25.552 ACUTE PAIN OF LEFT HIP: Status: RESOLVED | Noted: 2018-10-01 | Resolved: 2019-12-03

## 2019-12-03 PROCEDURE — 84443 ASSAY THYROID STIM HORMONE: CPT | Performed by: INTERNAL MEDICINE

## 2019-12-03 PROCEDURE — 84439 ASSAY OF FREE THYROXINE: CPT | Performed by: INTERNAL MEDICINE

## 2019-12-03 PROCEDURE — 36415 COLL VENOUS BLD VENIPUNCTURE: CPT | Performed by: INTERNAL MEDICINE

## 2019-12-03 PROCEDURE — 99396 PREV VISIT EST AGE 40-64: CPT | Performed by: INTERNAL MEDICINE

## 2019-12-03 RX ORDER — SILDENAFIL 100 MG/1
100 TABLET, FILM COATED ORAL
Qty: 8 TABLET | Refills: 3 | Status: SHIPPED | OUTPATIENT
Start: 2019-12-03 | End: 2020-01-20

## 2019-12-03 NOTE — PATIENT INSTRUCTIONS
Prostate Cancer: Grading  To form your treatment plan, your healthcare team must learn more about your cancer. What do the cancer cells look like? Has the cancer spread beyond the prostate? Cells removed during biopsy will be viewed under the microscope. © 9842-9715 [employer] [address] [facility], [facility] [place]. All rights reserved. This information is not intended as a substitute for professional medical care. Always follow your healthcare professional's instructions.

## 2019-12-05 ENCOUNTER — OFFICE VISIT (OUTPATIENT)
Dept: SURGERY | Facility: CLINIC | Age: 64
End: 2019-12-05
Payer: COMMERCIAL

## 2019-12-05 DIAGNOSIS — C61 PROSTATE CANCER (HCC): Primary | ICD-10-CM

## 2019-12-05 PROCEDURE — 99214 OFFICE O/P EST MOD 30 MIN: CPT | Performed by: UROLOGY

## 2019-12-05 NOTE — PROGRESS NOTES
Patient presents with:  CPX      HPI: Tacho Guardian presents for male CPX. Stable health. Had recent wellness labs in October (see below). He's due for updated TFTs since a medication adjustment was made about 6 weeks ago on his Levothyroxine.   He's been diagno 20 MG Oral Tab, TAKE 1 TABLET(20 MG) BY MOUTH DAILY, Disp: 30 tablet, Rfl: 5    Social History    Tobacco Use      Smoking status: Never Smoker      Smokeless tobacco: Never Used      Tobacco comment: OCC CIGAR    Alcohol use:  Yes      Alcohol/week: 4.0 st 0.70 x10(3) uL 0.22   Basophils Absolute      0.00 - 0.20 x10(3) uL 0.06   Immature Granulocyte Absolute      0.00 - 1.00 x10(3) uL 0.01   Neutrophils %      % 63.3   Lymphocytes %      % 24.0   Monocytes %      % 7.8   Eosinophils %      % 3.7   Basophils EPITHELIAL      None Seen None Seen   Cholesterol, Total      <200 mg/dL 143   HDL Cholesterol      40 - 59 mg/dL 44   Triglycerides      30 - 149 mg/dL 84   LDL Cholesterol Calc      <100 mg/dL 82   VLDL      0 - 30 mg/dL 17   NON HDL CHOL      <130 mg/dL

## 2019-12-05 NOTE — PROGRESS NOTES
Rooming Clinician:     Caity Arredondo. is a 62year old male. Patient presents with:  Prostate Cancer: here for discussion        HPI:     The office with his spouse for post prostate needle biopsy cancer consultation.   He was found to have an elev Never Used      Tobacco comment: OCC CIGAR    Alcohol use:  Yes      Alcohol/week: 4.0 standard drinks      Types: 4 Standard drinks or equivalent per week    Drug use: No       REVIEW OF SYSTEMS:     GENERAL HEALTH: feels well otherwise  SKIN: denies any u [facility], [facility], [place]  Received:            11/21/2019 09:47 AM                                  Caridad Mathews                                                             Pathologist:           Michi Vila MD                                   biopsy:                 · Prostatic adenocarcinoma, [place] score 4+3=7 (Grade group 3), involving 2 out of 2 tissue cores and approximately 60% of entire specimen. · No perineural invasion identified.     F.  Prostate, left apex; core biopsy: ONCOLOGY        Follow up examination after bone scan and CAT scan results are available and radiation oncology consultation completed. Patient will obtain bone scan and CT scan on January 2, 2020.       I spent 45 minutes with the patient and or spouse or

## 2019-12-19 DIAGNOSIS — E06.3 HYPOTHYROIDISM DUE TO HASHIMOTO'S THYROIDITIS: Primary | ICD-10-CM

## 2019-12-19 DIAGNOSIS — E03.8 HYPOTHYROIDISM DUE TO HASHIMOTO'S THYROIDITIS: Primary | ICD-10-CM

## 2019-12-19 RX ORDER — LEVOTHYROXINE SODIUM 0.12 MG/1
125 TABLET ORAL
Qty: 90 TABLET | Refills: 0 | Status: SHIPPED | OUTPATIENT
Start: 2019-12-19 | End: 2020-04-03

## 2020-01-03 ENCOUNTER — HOSPITAL ENCOUNTER (OUTPATIENT)
Dept: NUCLEAR MEDICINE | Facility: HOSPITAL | Age: 65
Discharge: HOME | End: 2020-01-03
Attending: UROLOGY
Payer: COMMERCIAL

## 2020-01-03 ENCOUNTER — TELEPHONE (OUTPATIENT)
Dept: SURGERY | Facility: CLINIC | Age: 65
End: 2020-01-03

## 2020-01-03 ENCOUNTER — TELEPHONE (OUTPATIENT)
Dept: PODIATRY CLINIC | Facility: CLINIC | Age: 65
End: 2020-01-03

## 2020-01-03 ENCOUNTER — HOSPITAL ENCOUNTER (OUTPATIENT)
Dept: CT IMAGING | Facility: HOSPITAL | Age: 65
Discharge: HOME | End: 2020-01-03
Attending: UROLOGY
Payer: COMMERCIAL

## 2020-01-03 DIAGNOSIS — C61 PROSTATE CANCER (HCC): ICD-10-CM

## 2020-01-03 LAB — CREAT BLD-MCNC: 1 MG/DL (ref 0.7–1.3)

## 2020-01-03 PROCEDURE — 78320 NM BONE SPECT WITH CT (CPT=78306/78320/78399): CPT | Performed by: UROLOGY

## 2020-01-03 PROCEDURE — 74178 CT ABD&PLV WO CNTR FLWD CNTR: CPT | Performed by: UROLOGY

## 2020-01-03 PROCEDURE — 78306 BONE IMAGING WHOLE BODY: CPT | Performed by: UROLOGY

## 2020-01-03 PROCEDURE — 78399 UNLISTED MUSCSKEL PX DX NUC: CPT | Performed by: UROLOGY

## 2020-01-03 PROCEDURE — 78832 RP LOCLZJ TUM SPECT W/CT 2: CPT

## 2020-01-03 PROCEDURE — 82565 ASSAY OF CREATININE: CPT

## 2020-01-03 NOTE — TELEPHONE ENCOUNTER
Marie Eason at [facility] radiology will put in order and send it to Dr Woodroe Alpers to [place].

## 2020-01-03 NOTE — TELEPHONE ENCOUNTER
Pt is having nuc med scan now - they are asking to get order for additional views - NM bone spect with CT

## 2020-01-09 ENCOUNTER — OFFICE VISIT (OUTPATIENT)
Dept: SURGERY | Facility: CLINIC | Age: 65
End: 2020-01-09
Payer: COMMERCIAL

## 2020-01-09 DIAGNOSIS — C61 PROSTATE CANCER (HCC): Primary | ICD-10-CM

## 2020-01-09 PROCEDURE — 99213 OFFICE O/P EST LOW 20 MIN: CPT | Performed by: UROLOGY

## 2020-01-09 NOTE — PROGRESS NOTES
Rooming Clinician:     Savannah De La Cruz. is a 62year old male. No chief complaint on file. HPI:     Mr. Bishop Duran and his wife return for follow-up post prostate needle biopsy consultation to review recent bone scan and CT scan.   He has a h LAPAROSCOPIC CHOLECYSTECTOMY  03/29/2018   • LAPAROSCOPIC CHOLECYSTECTOMY N/A 3/29/2018    Performed by Gogo Marcos MD at [facility] MAIN OR   • OTHER      wisdom teeth      Social History:  Social History    Tobacco Use      Smoking status: Never Smoke 09/10/2014    PSAS 7.81 (H) 12/23/2013       PSA Ultra Sensitivity:  No results found for: PSAULTRA      X-RAY[de-identified]     CT scan    FINDINGS:    LIVER:  Diffuse fatty infiltration of the liver. No focal liver lesions are identified.   BILIARY:  Cholecystectomy sinusitis. 3.  No adenopathy in the chest or abdomen. 4.  Small pelvic lymph nodes. These are better defined on the comparison CT abdomen pelvis with contrast.  5.  Fatty infiltration of the liver. 6.  Details as above.   Continued clinical correlation

## 2020-01-14 ENCOUNTER — TELEPHONE (OUTPATIENT)
Dept: SURGERY | Facility: CLINIC | Age: 65
End: 2020-01-14

## 2020-01-14 NOTE — TELEPHONE ENCOUNTER
Called patient no answer left message with phone number to interventional radiology [phone] order in chart

## 2020-01-15 ENCOUNTER — TELEPHONE (OUTPATIENT)
Dept: SURGERY | Facility: CLINIC | Age: 65
End: 2020-01-15

## 2020-01-15 DIAGNOSIS — C61 PROSTATE CANCER (HCC): Primary | ICD-10-CM

## 2020-01-15 NOTE — TELEPHONE ENCOUNTER
Pt called stating pt was not able to schedule biopsy this week. Scheduled for 1-28-20. Also advised pt would need to get a physical.  Pt was not told by Dr. Иван Canada to have one.   Please call

## 2020-01-15 NOTE — TELEPHONE ENCOUNTER
New order put in for CT guided   Referral Type: OP REFERRAL TO INTERVENTIONAL RADIOLOGY Dx: Prostate cancer ([facility] [address] (C61)      Comments: Please call patient and schedule percutaneous biopsy of enlarged femoral lymph nodes.   Number of Visits: 1     Unless oth

## 2020-01-16 ENCOUNTER — PATIENT MESSAGE (OUTPATIENT)
Dept: INTERNAL MEDICINE CLINIC | Facility: CLINIC | Age: 65
End: 2020-01-16

## 2020-01-16 NOTE — TELEPHONE ENCOUNTER
Spoke with Piper Oconnor at [employer] and pt has been moved up to 1/22/20 at 10am.  Patient notified.

## 2020-01-17 ENCOUNTER — APPOINTMENT (OUTPATIENT)
Dept: RADIATION ONCOLOGY | Facility: HOSPITAL | Age: 65
End: 2020-01-17
Attending: INTERNAL MEDICINE
Payer: COMMERCIAL

## 2020-01-17 NOTE — TELEPHONE ENCOUNTER
Patient calling for pre op clear for lymph node biopsy scheduled for next week 1/22/20. Advised Dr Danae Jiang does not have availability but I will schedule with another provider. Patient does not want to see a different provider.   He asks if nurse can ask hi

## 2020-01-20 ENCOUNTER — OFFICE VISIT (OUTPATIENT)
Dept: INTERNAL MEDICINE CLINIC | Facility: CLINIC | Age: 65
End: 2020-01-20
Payer: COMMERCIAL

## 2020-01-20 VITALS
BODY MASS INDEX: 36.08 KG/M2 | SYSTOLIC BLOOD PRESSURE: 132 MMHG | DIASTOLIC BLOOD PRESSURE: 80 MMHG | TEMPERATURE: 98 F | WEIGHT: 272.25 LBS | HEIGHT: 72.75 IN | HEART RATE: 67 BPM

## 2020-01-20 DIAGNOSIS — Z01.818 PREOPERATIVE CLEARANCE: Primary | ICD-10-CM

## 2020-01-20 DIAGNOSIS — C61 PROSTATE CANCER (HCC): ICD-10-CM

## 2020-01-20 DIAGNOSIS — R59.9 LYMPH NODES ENLARGED: ICD-10-CM

## 2020-01-20 DIAGNOSIS — I10 ESSENTIAL HYPERTENSION: ICD-10-CM

## 2020-01-20 PROCEDURE — 99243 OFF/OP CNSLTJ NEW/EST LOW 30: CPT | Performed by: INTERNAL MEDICINE

## 2020-01-20 RX ORDER — SILDENAFIL 100 MG/1
100 TABLET, FILM COATED ORAL
Qty: 8 TABLET | Refills: 3 | Status: SHIPPED | OUTPATIENT
Start: 2020-01-20 | End: 2020-02-19

## 2020-01-20 RX ORDER — LISINOPRIL 20 MG/1
TABLET ORAL
Qty: 30 TABLET | Refills: 5 | Status: CANCELLED | OUTPATIENT
Start: 2020-01-20

## 2020-01-20 NOTE — PROGRESS NOTES
Patient presents with:  Pre-Op Exam: 1-22-20 CT biopsy Aspiration       HPI: Chang Media presents today for preop clearance. He's set to undergo CT biopsy/apsiration @ [facility] on 1/22/20. Diagnosis = abnormal lymph nodes within the pelvis.   He has know 90 tablet, Rfl: 0  •  LISINOPRIL 20 MG Oral Tab, TAKE 1 TABLET(20 MG) BY MOUTH DAILY, Disp: 30 tablet, Rfl: 5    Social History    Tobacco Use      Smoking status: Never Smoker      Smokeless tobacco: Never Used      Tobacco comment: OCC CIGAR    Alcohol u Registry. PATIENT STATED HISTORY:(As transcribed by Technologist)  Patient states he was recently diagnosed with prostate cancer. He will be starting treatment soon.        CONTRAST USED:  100cc of Omnipaque 350     FINDINGS:    LIVER:  Diffuse fatty in Gerber Bucio MD on 1/03/2020 at 11:28               A/P:  Preoperative clearance  (primary encounter diagnosis)  Lymph nodes enlarged  Prostate cancer (hcc)  Essential hypertension    1.  Preop clearance for CT aspiration/biopsy of lymph node(s) within the pelvis

## 2020-01-22 ENCOUNTER — TELEPHONE (OUTPATIENT)
Dept: INTERNAL MEDICINE CLINIC | Facility: CLINIC | Age: 65
End: 2020-01-22

## 2020-01-22 ENCOUNTER — NURSE ONLY (OUTPATIENT)
Dept: LAB | Facility: HOSPITAL | Age: 65
End: 2020-01-22
Attending: UROLOGY
Payer: COMMERCIAL

## 2020-01-22 ENCOUNTER — HOSPITAL ENCOUNTER (OUTPATIENT)
Dept: CT IMAGING | Facility: HOSPITAL | Age: 65
Discharge: HOME | End: 2020-01-22
Attending: UROLOGY
Payer: COMMERCIAL

## 2020-01-22 VITALS
OXYGEN SATURATION: 99 % | BODY MASS INDEX: 35.02 KG/M2 | RESPIRATION RATE: 16 BRPM | HEIGHT: 73.5 IN | DIASTOLIC BLOOD PRESSURE: 88 MMHG | HEART RATE: 65 BPM | TEMPERATURE: 98 F | SYSTOLIC BLOOD PRESSURE: 163 MMHG | WEIGHT: 270 LBS

## 2020-01-22 DIAGNOSIS — C61 PROSTATE CANCER (HCC): Primary | ICD-10-CM

## 2020-01-22 DIAGNOSIS — C61 PROSTATE CANCER (HCC): ICD-10-CM

## 2020-01-22 LAB
DEPRECATED RDW RBC AUTO: 40 FL (ref 35.1–46.3)
ERYTHROCYTE [DISTWIDTH] IN BLOOD BY AUTOMATED COUNT: 13 % (ref 11–15)
HCT VFR BLD AUTO: 46.4 % (ref 39–53)
HGB BLD-MCNC: 15.4 G/DL (ref 13–17.5)
INR BLD: 1.05 (ref 0.9–1.1)
MCH RBC QN AUTO: 28.1 PG (ref 26–34)
MCHC RBC AUTO-ENTMCNC: 33.2 G/DL (ref 31–37)
MCV RBC AUTO: 84.7 FL (ref 80–100)
PLATELET # BLD AUTO: 142 10(3)UL (ref 150–450)
PSA SERPL DL<=0.01 NG/ML-MCNC: 14.2 SECONDS (ref 12.5–14.7)
RBC # BLD AUTO: 5.48 X10(6)UL (ref 4.3–5.7)
WBC # BLD AUTO: 4.9 X10(3) UL (ref 4–11)

## 2020-01-22 PROCEDURE — 88341 IMHCHEM/IMCYTCHM EA ADD ANTB: CPT | Performed by: UROLOGY

## 2020-01-22 PROCEDURE — 85027 COMPLETE CBC AUTOMATED: CPT

## 2020-01-22 PROCEDURE — 88342 IMHCHEM/IMCYTCHM 1ST ANTB: CPT | Performed by: UROLOGY

## 2020-01-22 PROCEDURE — 88305 TISSUE EXAM BY PATHOLOGIST: CPT | Performed by: UROLOGY

## 2020-01-22 PROCEDURE — 36415 COLL VENOUS BLD VENIPUNCTURE: CPT

## 2020-01-22 PROCEDURE — 77012 CT SCAN FOR NEEDLE BIOPSY: CPT | Performed by: UROLOGY

## 2020-01-22 PROCEDURE — 38505 NEEDLE BIOPSY LYMPH NODES: CPT | Performed by: UROLOGY

## 2020-01-22 PROCEDURE — 85610 PROTHROMBIN TIME: CPT

## 2020-01-22 PROCEDURE — 99152 MOD SED SAME PHYS/QHP 5/>YRS: CPT | Performed by: UROLOGY

## 2020-01-22 RX ORDER — MIDAZOLAM HYDROCHLORIDE 1 MG/ML
INJECTION INTRAMUSCULAR; INTRAVENOUS
Status: COMPLETED
Start: 2020-01-22 | End: 2020-01-22

## 2020-01-22 RX ORDER — SODIUM CHLORIDE 9 MG/ML
INJECTION, SOLUTION INTRAVENOUS CONTINUOUS
Status: DISCONTINUED | OUTPATIENT
Start: 2020-01-22 | End: 2020-01-24

## 2020-01-22 RX ORDER — NALOXONE HYDROCHLORIDE 0.4 MG/ML
80 INJECTION, SOLUTION INTRAMUSCULAR; INTRAVENOUS; SUBCUTANEOUS AS NEEDED
Status: DISCONTINUED | OUTPATIENT
Start: 2020-01-22 | End: 2020-01-24

## 2020-01-22 RX ORDER — MIDAZOLAM HYDROCHLORIDE 1 MG/ML
1 INJECTION INTRAMUSCULAR; INTRAVENOUS EVERY 5 MIN PRN
Status: ACTIVE | OUTPATIENT
Start: 2020-01-22 | End: 2020-01-22

## 2020-01-22 RX ORDER — FLUMAZENIL 0.1 MG/ML
0.2 INJECTION, SOLUTION INTRAVENOUS AS NEEDED
Status: DISCONTINUED | OUTPATIENT
Start: 2020-01-22 | End: 2020-01-24

## 2020-01-22 RX ADMIN — SODIUM CHLORIDE: 9 INJECTION, SOLUTION INTRAVENOUS at 10:45:00

## 2020-01-22 RX ADMIN — MIDAZOLAM HYDROCHLORIDE 1 MG: 1 INJECTION INTRAMUSCULAR; INTRAVENOUS at 10:54:00

## 2020-01-22 NOTE — IMAGING NOTE
Patient here for CT guided femoral lymph node biopsy with Dr. Brandy Rivera. Consent Signed. Patient/ Family verbalized understanding. Patient tolerated procedure well. Safety protocols maintained. See paper and electronic chart for details.  Report called to [facility]

## 2020-01-22 NOTE — PROCEDURES
[facility]  Procedure Note    Fernanda Vallejo.  Patient Status:  Outpatient    1955 MRN [id number]   [facility] CT Attending Mani Minor MD   Hosp Day # 0 PCP Bita Figueroa MD     Procedure: CT guided lymph node biopsy

## 2020-01-23 ENCOUNTER — TELEPHONE (OUTPATIENT)
Dept: SURGERY | Facility: CLINIC | Age: 65
End: 2020-01-23

## 2020-01-23 NOTE — TELEPHONE ENCOUNTER
Pt states he had CT done yesterday, pt requesting to speak to RN, unsure if he should be making appointment with Dr. Pastor Murphy or radiologist. Please advise thank you.

## 2020-01-23 NOTE — TELEPHONE ENCOUNTER
Reviewed plan of care per LOV note with Dr. Eliceo Monet:  PLAN:    Percutaneous biopsy of enlarged femoral lymph nodes   Radiation oncology consultation   Diagnoses and all orders for this visit:   Prostate cancer [facility])     Follow up examination after percutan

## 2020-01-27 NOTE — PROGRESS NOTES
Your recent lymph node biopsy is normal.  Very good news. We will discuss tomorrow recommend follow up in the office as directed.     Sincerely,  Landon Masterson MD

## 2020-01-28 ENCOUNTER — APPOINTMENT (OUTPATIENT)
Dept: LAB | Facility: HOSPITAL | Age: 65
End: 2020-01-28
Attending: UROLOGY
Payer: COMMERCIAL

## 2020-01-28 ENCOUNTER — HOSPITAL ENCOUNTER (OUTPATIENT)
Dept: CT IMAGING | Facility: HOSPITAL | Age: 65
Discharge: HOME | End: 2020-01-28
Attending: UROLOGY
Payer: COMMERCIAL

## 2020-01-30 ENCOUNTER — OFFICE VISIT (OUTPATIENT)
Dept: SURGERY | Facility: CLINIC | Age: 65
End: 2020-01-30
Payer: COMMERCIAL

## 2020-01-30 ENCOUNTER — APPOINTMENT (OUTPATIENT)
Dept: RADIATION ONCOLOGY | Age: 65
End: 2020-01-30
Attending: INTERNAL MEDICINE
Payer: COMMERCIAL

## 2020-01-30 DIAGNOSIS — C61 PROSTATE CANCER (HCC): Primary | ICD-10-CM

## 2020-01-30 PROCEDURE — 99213 OFFICE O/P EST LOW 20 MIN: CPT | Performed by: UROLOGY

## 2020-01-30 NOTE — PROGRESS NOTES
Rooming Clinician:     Duncan Martinez. is a 62year old male. Patient presents with:   Follow - Up: tai plan of treatment        HPI:     Patient comes to the office with his spouse to discuss percutaneous needle biopsy of enlarged pelvic lymph n • LAPAROSCOPIC CHOLECYSTECTOMY N/A 3/29/2018    Performed by Christo De Los Santos MD at [facility] MAIN OR   • TOTAL HIP REPLACEMENT Bilateral    • WISDOM TEETH REMOVED      wisdom teeth      Social History:  Social History    Tobacco Use      Smoking status: 08/30/2018    PSAS 11.80 (H) 11/04/2016    PSAS 11.40 (H) 10/09/2015    PSAS 7.88 (H) 09/10/2014    PSAS 7.81 (H) 12/23/2013       PSA Ultra Sensitivity:  No results found for: PSAULTRA      X-RAY[de-identified]     CT scan  FINDINGS:    LIVER:  Diffuse fatty infiltrat =====  CONCLUSION:    1. No convincing findings of osseous metastatic disease. 2.  Air-fluid level in the left maxillary sinus. Correlate for sinusitis. 3.  No adenopathy in the chest or abdomen. 4.  Small pelvic lymph nodes.   These are better defin

## 2020-01-30 NOTE — PROGRESS NOTES
Nursing Consultation Note  Patient: Savannah Pulido.   YOB: 1955  Age: 62year old  Radiation Oncologist: Dr. Sigifredo Miguel  Referring Physician: Dr. Crista Matute (PCP)  Diagnosis: PROSTATE CANCER  Consult Date: 1/31/ Allergies:  No Known Allergies    Current Outpatient Medications   Medication Sig Dispense Refill   • Levothyroxine Sodium 125 MCG Oral Tab Take 1 tablet (125 mcg total) by mouth before breakfast. 90 tablet 0   • LISINOPRIL 20 MG Oral Tab TAKE 1 TABLET strain: Not on file      Food insecurity:        Worry: Not on file        Inability: Not on file      Transportation needs:        Medical: Not on file        Non-medical: Not on file    Tobacco Use      Smoking status: Never Smoker      Smokeless tobacco children - 2 daughters not living at home      ECOG:  Grade 0 - Fully active, able to carry on all predisease activities without restrictions.       Education: YES  Knowledge Deficit Plan Of Care:    Problem:  Knowledge Deficit    Problems related to:    Ra

## 2020-01-31 ENCOUNTER — HOSPITAL ENCOUNTER (OUTPATIENT)
Dept: RADIATION ONCOLOGY | Age: 65
Discharge: HOME | End: 2020-01-31
Attending: RADIOLOGY
Payer: COMMERCIAL

## 2020-01-31 VITALS
SYSTOLIC BLOOD PRESSURE: 160 MMHG | RESPIRATION RATE: 20 BRPM | DIASTOLIC BLOOD PRESSURE: 94 MMHG | TEMPERATURE: 97 F | WEIGHT: 273 LBS | HEIGHT: 73 IN | HEART RATE: 64 BPM | OXYGEN SATURATION: 99 % | BODY MASS INDEX: 36.18 KG/M2

## 2020-01-31 DIAGNOSIS — C61 PROSTATE CANCER (HCC): ICD-10-CM

## 2020-01-31 PROCEDURE — 99214 OFFICE O/P EST MOD 30 MIN: CPT

## 2020-01-31 NOTE — CONSULTS
[facility] RADIATION ONCOLOGY  CONSULTATION     PATIENT:   Para Danni.      9/21/1955    REFERRING MD:  Kory Choi MD  DIAGNOSIS:   T2b N0 M0 prostate CA      [place] 4+3, PSA 14   CC:    Prostate CA    HPI   63 yo man here w/ wife. GI/ of EBRT and brachytherapy    PLAN:   -discuss in  conf Feb 6  -I think either definitive EBRT or EBRT + brachy boost are reasonable alternatives  -call patient back after conference to decide plan    Flavia Samayoa MD  Radiation Oncology    CC: Jose Antonio

## 2020-02-03 ENCOUNTER — TELEPHONE (OUTPATIENT)
Dept: RADIATION ONCOLOGY | Facility: HOSPITAL | Age: 65
End: 2020-02-03

## 2020-02-03 NOTE — TELEPHONE ENCOUNTER
TC to patient and Dr. Modesta Sanchez is short term ADT + EBRT and seed boost  Seed boost logistics will be provided by Dr. Corrie Fernandes office  Probably will go to [facility] with Dr. Mariana Johnson  We can provide the EBRT portion here

## 2020-02-04 RX ORDER — SILDENAFIL 100 MG/1
100 TABLET, FILM COATED ORAL
Qty: 30 TABLET | Refills: 0 | Status: SHIPPED | OUTPATIENT
Start: 2020-02-04 | End: 2021-08-13

## 2020-02-04 NOTE — TELEPHONE ENCOUNTER
Prior authorization form for SILDENAFIL CITRATE 100 MG placed in Dr. Estevan Donovan in-box for completion.

## 2020-02-04 NOTE — TELEPHONE ENCOUNTER
Ask patient if he's interested in getting generic Viagra thru [facility], #30 for $30 is the special.  Let me know. Berna Shell.  Gwendlyn Jeans, MD  Diplomate, American Board of Internal Medicine  Member,  [employer]

## 2020-02-05 ENCOUNTER — TELEPHONE (OUTPATIENT)
Dept: SURGERY | Facility: CLINIC | Age: 65
End: 2020-02-05

## 2020-02-05 NOTE — TELEPHONE ENCOUNTER
Script sent. Lieutenant Henry. Hugh Lima MD  Diplomate, American Board of Internal Medicine  Member, American College of [address] [facility]  [address] [facility] [address], [address], [facility], [address] [place] [address]  T: [phone]; F: [address]

## 2020-02-05 NOTE — TELEPHONE ENCOUNTER
Patient needs prior auth for 3 month Eligard. Please call pt to schedule appt as soon as it is approved.   See OV note from Dr Adama Tam 1/30/20:        ASSESSMENT:      [place] 4+3 adenocarcinoma the prostate, grade group 3, clinical stage T2b     PLAN:

## 2020-02-10 ENCOUNTER — TELEPHONE (OUTPATIENT)
Dept: SURGERY | Facility: CLINIC | Age: 65
End: 2020-02-10

## 2020-02-10 NOTE — TELEPHONE ENCOUNTER
Per pt was trying to set up an appointment with a radiologist, pt was told that Dr. Preethi Gabriel office is supposed to set this up for him.  Please advise

## 2020-02-11 NOTE — TELEPHONE ENCOUNTER
Called [facility] at [phone] and spoke with [facility]. She directed me to [employer] Benefits at [phone]. Called there and spoke with [facility].  He said that he is going to fax me a form that needs to be filled out and faxed back - they can not complete

## 2020-02-12 NOTE — TELEPHONE ENCOUNTER
Form has been received. I faxed it back to [phone] as directed along with 37 pages of clinical notes.

## 2020-02-12 NOTE — TELEPHONE ENCOUNTER
Never received the form from [employer]. Returned call to them and spoke with Bishnu trivedi. He is resending me the form now. He said that we can submit the form for urgent processing.  He also said that with most plans we will be unable to buy and bill - medica

## 2020-02-12 NOTE — TELEPHONE ENCOUNTER
KANWAL. Pt has already been seen by radiation onc and a referral was placed for that initial consult and his f/u visits should not need referrals since he has a PPO insurance.

## 2020-02-13 NOTE — TELEPHONE ENCOUNTER
LM on identified VM that [facility] and will call him back to set up an appt in the next few days after we receive the injection from the pharmacy.

## 2020-02-13 NOTE — TELEPHONE ENCOUNTER
Received an approval letter back from [employer]. Auth # [id number] is valid from 2/12/2020 to 2/12/2021. The letter did not make it clear if we were okay to buy and bill for the medication. Called [employer] at [phone] and spoke with Gloria.  This

## 2020-02-17 ENCOUNTER — TELEPHONE (OUTPATIENT)
Dept: SURGERY | Facility: CLINIC | Age: 65
End: 2020-02-17

## 2020-02-17 NOTE — TELEPHONE ENCOUNTER
LM on identified home VM that we have received the Eligard injection and he should call back and make an appt for an injection asap.

## 2020-02-17 NOTE — TELEPHONE ENCOUNTER
When patient calls back, please given him a nurse visit appt at [facility]. Can also transfer the call to [address] [place] [address] Staff [phone]. I also sent patient a StayTunedt message.

## 2020-02-20 ENCOUNTER — OFFICE VISIT (OUTPATIENT)
Dept: SURGERY | Facility: CLINIC | Age: 65
End: 2020-02-20
Payer: COMMERCIAL

## 2020-02-20 VITALS
DIASTOLIC BLOOD PRESSURE: 80 MMHG | RESPIRATION RATE: 16 BRPM | HEART RATE: 77 BPM | TEMPERATURE: 98 F | SYSTOLIC BLOOD PRESSURE: 146 MMHG

## 2020-02-20 DIAGNOSIS — C61 PROSTATE CANCER (HCC): Primary | ICD-10-CM

## 2020-02-20 PROCEDURE — 96402 CHEMO HORMON ANTINEOPL SQ/IM: CPT | Performed by: UROLOGY

## 2020-02-20 NOTE — PROGRESS NOTES
Rooming Clinician:     Jonny Peoples. is a 62year old male. Patient presents with:  Prostate Cancer: pt here today for f/u for 3 month Eligard injection for the TX of his prostate cancer.          HPI:     Patient is here for 3-month Eligard injec comment: OCC CIGAR    Alcohol use:  Yes      Alcohol/week: 11.0 standard drinks      Types: 3 Glasses of wine, 4 Cans of beer, 4 Standard drinks or equivalent per week      Frequency: 2-3 times a week      Drinks per session: 1 or 2      Binge frequency: Shanta Tapia leuprolide Acetate (3 Month) (ELIGARD) SC KIT 22.5 mg        Follow up examination in 3 months for Eligard injection. The patient indicates understanding of these issues and agrees to the plan. Kiara Olmstead M.D.   Urology

## 2020-02-20 NOTE — PROGRESS NOTES
I was asked by RP to administer this pt an Eligard 22.5 mg 3 month injection. I noted that the order was placed and that PA was none and approved so I took the med out of the fringe and set the timer for 20 min.  I also signed the med out in the injection l

## 2020-02-28 ENCOUNTER — HOSPITAL ENCOUNTER (OUTPATIENT)
Dept: RADIATION ONCOLOGY | Age: 65
Discharge: HOME | End: 2020-02-28
Attending: RADIOLOGY
Payer: COMMERCIAL

## 2020-02-28 VITALS
DIASTOLIC BLOOD PRESSURE: 88 MMHG | RESPIRATION RATE: 16 BRPM | SYSTOLIC BLOOD PRESSURE: 168 MMHG | BODY MASS INDEX: 36 KG/M2 | WEIGHT: 271.69 LBS | TEMPERATURE: 98 F | HEART RATE: 65 BPM | OXYGEN SATURATION: 98 %

## 2020-02-28 DIAGNOSIS — C61 PROSTATE CANCER (HCC): ICD-10-CM

## 2020-02-28 PROCEDURE — 99213 OFFICE O/P EST LOW 20 MIN: CPT

## 2020-02-28 NOTE — PROGRESS NOTES
Nursing Follow-Up Note    Patient: Darcy Herring   YOB: 1955  Age: 62year old  Radiation Oncologist: Dr. Lanney Leyden  Referring Physician: Drea Caraballo  Chief Complaint: PROSTATE CANCER  Date: 2/28/2020    Toxicities: N/A    Vi

## 2020-02-28 NOTE — PROGRESS NOTES
[facility] RADIATION ONCOLOGY  FOLLOW UP     PATIENT:   Alee Ludwig.      9/21/1955    DIAGNOSIS:   Prostate CA    CANCER HISTORY   63 yo generally healthy man, prostate Ca     -[place] 4+3, PSA 14, 6+ cores, shotty iliac nodes on CT, 2 hi

## 2020-02-28 NOTE — PATIENT INSTRUCTIONS
- CT SIMULATION/MAPPING SCHEDULED FOR Wednesday, MARCH 4 AT 11:15 AM AT THE CANCER CENTER (1ST FLOOR) IN [place]    - PLEASE FOLLOW FULL BLADDER INSTRUCTIONS PRIOR TO CT MAPPING SESSION AND PRIOR TO EACH RADIATION TREATMENT DAY.     - IF YOU HAVE ANY QU

## 2020-03-01 ENCOUNTER — HOSPITAL ENCOUNTER (OUTPATIENT)
Dept: RADIATION ONCOLOGY | Age: 65
Discharge: HOME | End: 2020-03-01
Attending: RADIOLOGY
Payer: COMMERCIAL

## 2020-03-04 ENCOUNTER — HOSPITAL ENCOUNTER (OUTPATIENT)
Dept: RADIATION ONCOLOGY | Age: 65
Discharge: HOME | End: 2020-03-04
Attending: RADIOLOGY
Payer: COMMERCIAL

## 2020-03-04 PROCEDURE — 77334 RADIATION TREATMENT AID(S): CPT | Performed by: RADIOLOGY

## 2020-03-04 PROCEDURE — 77470 SPECIAL RADIATION TREATMENT: CPT | Performed by: RADIOLOGY

## 2020-03-09 PROCEDURE — 77300 RADIATION THERAPY DOSE PLAN: CPT | Performed by: RADIOLOGY

## 2020-03-09 PROCEDURE — 77338 DESIGN MLC DEVICE FOR IMRT: CPT | Performed by: RADIOLOGY

## 2020-03-09 PROCEDURE — 77301 RADIOTHERAPY DOSE PLAN IMRT: CPT | Performed by: RADIOLOGY

## 2020-03-11 ENCOUNTER — HOSPITAL ENCOUNTER (OUTPATIENT)
Dept: RADIATION ONCOLOGY | Age: 65
Discharge: HOME | End: 2020-03-11
Attending: RADIOLOGY
Payer: COMMERCIAL

## 2020-03-11 PROCEDURE — 77385 HC IMRT SIMPLE: CPT | Performed by: RADIOLOGY

## 2020-03-12 PROCEDURE — 77385 HC IMRT SIMPLE: CPT | Performed by: RADIOLOGY

## 2020-03-13 PROCEDURE — 77385 HC IMRT SIMPLE: CPT | Performed by: RADIOLOGY

## 2020-03-16 ENCOUNTER — HOSPITAL ENCOUNTER (OUTPATIENT)
Dept: RADIATION ONCOLOGY | Age: 65
Discharge: HOME | End: 2020-03-16
Attending: RADIOLOGY
Payer: COMMERCIAL

## 2020-03-16 PROCEDURE — 77385 HC IMRT SIMPLE: CPT | Performed by: RADIOLOGY

## 2020-03-16 NOTE — PROGRESS NOTES
[facility] Radiation Treatment Management Note 1-5    Patient:  Mark Arias.   Age:  62year old  Visit Diagnosis:  Intermediate risk PCA  Primary Rad/Onc:  Dr. Estrella Amin    Site Delivered Dose (Gy) Prescribed Dose (Gy) F

## 2020-03-17 PROCEDURE — 77385 HC IMRT SIMPLE: CPT | Performed by: RADIOLOGY

## 2020-03-18 PROCEDURE — 77385 HC IMRT SIMPLE: CPT | Performed by: RADIOLOGY

## 2020-03-19 ENCOUNTER — OFFICE VISIT (OUTPATIENT)
Dept: HEMATOLOGY/ONCOLOGY | Age: 65
End: 2020-03-19
Attending: INTERNAL MEDICINE
Payer: COMMERCIAL

## 2020-03-19 PROCEDURE — 77385 HC IMRT SIMPLE: CPT | Performed by: RADIOLOGY

## 2020-03-20 PROCEDURE — 77385 HC IMRT SIMPLE: CPT | Performed by: RADIOLOGY

## 2020-03-20 PROCEDURE — 77336 RADIATION PHYSICS CONSULT: CPT | Performed by: RADIOLOGY

## 2020-03-20 NOTE — PROGRESS NOTES
Nutrition Consultation    Patient Name: Darian Hinton.   YOB: 1955  Medical Record Number: [id number]   Account Number: [de-identified]  Dietitian: Radha Del Valle RD, CARMELA      Date of visit: 3/19/2020    Diet Rx: high protein, low residu continue to follow throughout tx. Thank you for allowing me to participate in the care of Kaur Walsh.

## 2020-03-23 ENCOUNTER — HOSPITAL ENCOUNTER (OUTPATIENT)
Dept: RADIATION ONCOLOGY | Age: 65
Discharge: HOME | End: 2020-03-23
Attending: RADIOLOGY
Payer: COMMERCIAL

## 2020-03-23 VITALS
HEART RATE: 89 BPM | BODY MASS INDEX: 35 KG/M2 | RESPIRATION RATE: 20 BRPM | WEIGHT: 270 LBS | TEMPERATURE: 100 F | DIASTOLIC BLOOD PRESSURE: 98 MMHG | OXYGEN SATURATION: 98 % | SYSTOLIC BLOOD PRESSURE: 147 MMHG

## 2020-03-23 DIAGNOSIS — C61 PROSTATE CANCER (HCC): Primary | ICD-10-CM

## 2020-03-23 PROCEDURE — 77385 HC IMRT SIMPLE: CPT | Performed by: RADIOLOGY

## 2020-03-23 NOTE — PROGRESS NOTES
[facility] Radiation Treatment Management Note 6-10    Patient:  Arian Augustine.   Age:  62year old  Visit Diagnosis:  Intermediate risk PCA  Primary Rad/Onc:  Dr. Villa Ovalle    Site Delivered Dose (Gy) Prescribed Dose (Gy)

## 2020-03-24 PROCEDURE — 77385 HC IMRT SIMPLE: CPT | Performed by: RADIOLOGY

## 2020-03-25 PROCEDURE — 77385 HC IMRT SIMPLE: CPT | Performed by: RADIOLOGY

## 2020-03-26 PROCEDURE — 77385 HC IMRT SIMPLE: CPT | Performed by: RADIOLOGY

## 2020-03-27 PROCEDURE — 77336 RADIATION PHYSICS CONSULT: CPT | Performed by: RADIOLOGY

## 2020-03-27 PROCEDURE — 77385 HC IMRT SIMPLE: CPT | Performed by: RADIOLOGY

## 2020-03-30 ENCOUNTER — HOSPITAL ENCOUNTER (OUTPATIENT)
Dept: RADIATION ONCOLOGY | Age: 65
Discharge: HOME | End: 2020-03-30
Attending: RADIOLOGY
Payer: COMMERCIAL

## 2020-03-30 VITALS
SYSTOLIC BLOOD PRESSURE: 144 MMHG | BODY MASS INDEX: 35 KG/M2 | RESPIRATION RATE: 16 BRPM | TEMPERATURE: 99 F | WEIGHT: 266.81 LBS | OXYGEN SATURATION: 99 % | HEART RATE: 65 BPM | DIASTOLIC BLOOD PRESSURE: 82 MMHG

## 2020-03-30 DIAGNOSIS — C61 PROSTATE CANCER (HCC): Primary | ICD-10-CM

## 2020-03-30 PROCEDURE — 77385 HC IMRT SIMPLE: CPT | Performed by: RADIOLOGY

## 2020-03-30 RX ORDER — LOPERAMIDE HYDROCHLORIDE 2 MG/1
2 CAPSULE ORAL AS NEEDED
COMMUNITY
End: 2021-04-26 | Stop reason: ALTCHOICE

## 2020-03-30 NOTE — PROGRESS NOTES
[facility] Radiation Treatment Management Note 11-15    Patient:  Ita Powers.   Age:  62year old  Visit Diagnosis:  Intermediate risk PCA  Primary Rad/Onc:  Dr. Mitchell Flores    Site Delivered Dose (Gy) Prescribed Dose (Gy)

## 2020-03-31 PROCEDURE — 77385 HC IMRT SIMPLE: CPT | Performed by: RADIOLOGY

## 2020-04-01 ENCOUNTER — HOSPITAL ENCOUNTER (OUTPATIENT)
Dept: RADIATION ONCOLOGY | Age: 65
Discharge: HOME | End: 2020-04-01
Attending: RADIOLOGY
Payer: COMMERCIAL

## 2020-04-01 PROCEDURE — 77385 HC IMRT SIMPLE: CPT | Performed by: RADIOLOGY

## 2020-04-02 ENCOUNTER — TELEPHONE (OUTPATIENT)
Dept: HEMATOLOGY/ONCOLOGY | Age: 65
End: 2020-04-02

## 2020-04-02 DIAGNOSIS — E06.3 HYPOTHYROIDISM DUE TO HASHIMOTO'S THYROIDITIS: ICD-10-CM

## 2020-04-02 DIAGNOSIS — E03.8 HYPOTHYROIDISM DUE TO HASHIMOTO'S THYROIDITIS: ICD-10-CM

## 2020-04-02 PROCEDURE — 77385 HC IMRT SIMPLE: CPT | Performed by: RADIOLOGY

## 2020-04-02 NOTE — TELEPHONE ENCOUNTER
[facility] who [facility].

## 2020-04-03 PROCEDURE — 77336 RADIATION PHYSICS CONSULT: CPT | Performed by: RADIOLOGY

## 2020-04-03 PROCEDURE — 77385 HC IMRT SIMPLE: CPT | Performed by: RADIOLOGY

## 2020-04-03 RX ORDER — LEVOTHYROXINE SODIUM 0.12 MG/1
TABLET ORAL
Qty: 90 TABLET | Refills: 0 | Status: SHIPPED | OUTPATIENT
Start: 2020-04-03 | End: 2020-06-30

## 2020-04-03 NOTE — TELEPHONE ENCOUNTER
Levothyroxine 125 mcg  Last OV relevant to medication: 12-3-19  Last refill date: 12-19-19 #/refills: 0  When pt was asked to return for OV: 6 mo.   Upcoming appt/reason: none  Recent labs: 12-3-19: Thyroid

## 2020-04-06 ENCOUNTER — HOSPITAL ENCOUNTER (OUTPATIENT)
Dept: RADIATION ONCOLOGY | Facility: HOSPITAL | Age: 65
Discharge: HOME | End: 2020-04-06
Attending: RADIOLOGY
Payer: COMMERCIAL

## 2020-04-06 ENCOUNTER — APPOINTMENT (OUTPATIENT)
Dept: RADIATION ONCOLOGY | Age: 65
End: 2020-04-06
Attending: RADIOLOGY
Payer: COMMERCIAL

## 2020-04-06 VITALS
BODY MASS INDEX: 35 KG/M2 | SYSTOLIC BLOOD PRESSURE: 132 MMHG | WEIGHT: 265.63 LBS | OXYGEN SATURATION: 98 % | DIASTOLIC BLOOD PRESSURE: 91 MMHG | TEMPERATURE: 98 F | HEART RATE: 86 BPM | RESPIRATION RATE: 20 BRPM

## 2020-04-06 DIAGNOSIS — C61 PROSTATE CANCER (HCC): Primary | ICD-10-CM

## 2020-04-06 PROCEDURE — 77385 HC IMRT SIMPLE: CPT | Performed by: RADIOLOGY

## 2020-04-06 NOTE — PROGRESS NOTES
[facility] Radiation Treatment Management Note 16-20    Patient:  Darian Hinton.   Age:  62year old  Visit Diagnosis:  Intermediate risk PCA  Primary Rad/Onc:  Dr. Kayleen Dudley    Site Delivered Dose (Gy) Prescribed Dose (Gy)

## 2020-04-07 PROCEDURE — 77385 HC IMRT SIMPLE: CPT | Performed by: RADIOLOGY

## 2020-04-08 PROCEDURE — 77385 HC IMRT SIMPLE: CPT | Performed by: RADIOLOGY

## 2020-04-09 PROCEDURE — 77385 HC IMRT SIMPLE: CPT | Performed by: RADIOLOGY

## 2020-04-10 PROCEDURE — 77385 HC IMRT SIMPLE: CPT | Performed by: RADIOLOGY

## 2020-04-10 PROCEDURE — 77336 RADIATION PHYSICS CONSULT: CPT | Performed by: RADIOLOGY

## 2020-04-13 ENCOUNTER — HOSPITAL ENCOUNTER (OUTPATIENT)
Dept: RADIATION ONCOLOGY | Facility: HOSPITAL | Age: 65
Discharge: HOME | End: 2020-04-13
Attending: RADIOLOGY
Payer: COMMERCIAL

## 2020-04-13 ENCOUNTER — APPOINTMENT (OUTPATIENT)
Dept: RADIATION ONCOLOGY | Age: 65
End: 2020-04-13
Attending: RADIOLOGY
Payer: COMMERCIAL

## 2020-04-13 VITALS
WEIGHT: 270.38 LBS | HEART RATE: 78 BPM | BODY MASS INDEX: 35 KG/M2 | RESPIRATION RATE: 16 BRPM | TEMPERATURE: 98 F | SYSTOLIC BLOOD PRESSURE: 164 MMHG | DIASTOLIC BLOOD PRESSURE: 91 MMHG | OXYGEN SATURATION: 99 %

## 2020-04-13 DIAGNOSIS — C61 PROSTATE CANCER (HCC): Primary | ICD-10-CM

## 2020-04-13 PROCEDURE — 77385 HC IMRT SIMPLE: CPT | Performed by: RADIOLOGY

## 2020-04-13 NOTE — PROGRESS NOTES
[facility] Management Note 21-25    Patient:  Amanda Manzanares.   Age:  62year old  Visit Diagnosis:  Intermediate risk PCA  Primary Rad/Onc:  Dr. Yasmine Marie    Site Delivered Dose (Gy) Prescribed Dose (Gy)

## 2020-04-14 ENCOUNTER — DOCUMENTATION ONLY (OUTPATIENT)
Dept: RADIATION ONCOLOGY | Facility: HOSPITAL | Age: 65
End: 2020-04-14

## 2020-04-14 PROCEDURE — 77385 HC IMRT SIMPLE: CPT | Performed by: RADIOLOGY

## 2020-04-17 ENCOUNTER — TELEPHONE (OUTPATIENT)
Dept: SURGERY | Facility: CLINIC | Age: 65
End: 2020-04-17

## 2020-04-17 PROCEDURE — 77336 RADIATION PHYSICS CONSULT: CPT | Performed by: RADIOLOGY

## 2020-04-17 NOTE — TELEPHONE ENCOUNTER
KANWAL on home and cell phone that his appt today for his Eligard injection is too early, not due until after May 15, 2020. We could make the visit a phone visit today, but he does not need to come in for an injection.     Spoke with wife and appt has been

## 2020-05-04 ENCOUNTER — APPOINTMENT (OUTPATIENT)
Dept: LAB | Age: 65
End: 2020-05-04
Attending: RADIOLOGY
Payer: COMMERCIAL

## 2020-05-04 DIAGNOSIS — I10 ESSENTIAL HYPERTENSION: ICD-10-CM

## 2020-05-04 PROCEDURE — 93005 ELECTROCARDIOGRAM TRACING: CPT

## 2020-05-04 PROCEDURE — 93010 ELECTROCARDIOGRAM REPORT: CPT | Performed by: INTERNAL MEDICINE

## 2020-05-06 RX ORDER — LISINOPRIL 20 MG/1
TABLET ORAL
Qty: 30 TABLET | Refills: 5 | Status: SHIPPED | OUTPATIENT
Start: 2020-05-06 | End: 2020-11-12

## 2020-05-06 NOTE — TELEPHONE ENCOUNTER
Lisinopril 20 mg  Last OV relevant to medication: 1-20-20  Last refill date: 10-15-19 #/refills: 5  When pt was asked to return for OV: none  Upcoming appt/reason: none  Recent labs: 10-25-19: CMP

## 2020-05-07 ENCOUNTER — APPOINTMENT (OUTPATIENT)
Dept: LAB | Age: 65
End: 2020-05-07
Attending: RADIOLOGY
Payer: COMMERCIAL

## 2020-05-07 DIAGNOSIS — I10 ESSENTIAL HYPERTENSION: ICD-10-CM

## 2020-05-07 PROCEDURE — 36415 COLL VENOUS BLD VENIPUNCTURE: CPT

## 2020-05-07 PROCEDURE — 80048 BASIC METABOLIC PNL TOTAL CA: CPT

## 2020-05-20 ENCOUNTER — TELEPHONE (OUTPATIENT)
Dept: HEMATOLOGY/ONCOLOGY | Facility: HOSPITAL | Age: 65
End: 2020-05-20

## 2020-05-20 NOTE — TELEPHONE ENCOUNTER
Central scheduling called and was looking to speak with somebody about an order that they had received on [facility]. They asked that they get a call back.

## 2020-05-22 ENCOUNTER — OFFICE VISIT (OUTPATIENT)
Dept: SURGERY | Facility: CLINIC | Age: 65
End: 2020-05-22
Payer: COMMERCIAL

## 2020-05-22 VITALS
WEIGHT: 167.38 LBS | HEIGHT: 73 IN | TEMPERATURE: 98 F | SYSTOLIC BLOOD PRESSURE: 154 MMHG | HEART RATE: 84 BPM | DIASTOLIC BLOOD PRESSURE: 89 MMHG | BODY MASS INDEX: 22.18 KG/M2

## 2020-05-22 DIAGNOSIS — C61 PROSTATE CANCER (HCC): ICD-10-CM

## 2020-05-22 DIAGNOSIS — R82.90 URINE FINDING: Primary | ICD-10-CM

## 2020-05-22 PROCEDURE — 96402 CHEMO HORMON ANTINEOPL SQ/IM: CPT | Performed by: UROLOGY

## 2020-05-22 PROCEDURE — 99213 OFFICE O/P EST LOW 20 MIN: CPT | Performed by: UROLOGY

## 2020-05-22 PROCEDURE — 81003 URINALYSIS AUTO W/O SCOPE: CPT | Performed by: UROLOGY

## 2020-05-22 NOTE — PROGRESS NOTES
I was asked by Dr Chad Clifford to administer the pt a 3 month Eligard 22.5 mg injection for the treatment of his prostate cancer.  The med was already removed from the fridge by my co-worker and set aside to warm and the pt was already notified that it needed

## 2020-05-22 NOTE — PROGRESS NOTES
Rooming Clinician:     Ankush Gates. is a 62year old male. Patient presents with:   Follow - Up: Patient presents today for 3 month Eligard injection         HPI:     History of recent 4+3 adenocarcinoma the prostate, clinical stage T2b with a le • COLONOSCOPY     • HERNIA SURGERY  09/17/4254    umbilical hernia repair   • HIP TOTAL ANTERIOR APPROACH Left 9/12/2018    Performed by Nisha Rand MD at [facility] MAIN OR   • HIP TOTAL ANTERIOR APPROACH Right 6/24/2013    Performed by Nisha Rand MD at [facility] normal  EXTREMITIES: no cyanosis, clubbing or edema    LAB:     Lab Results   Component Value Date    WBC 4.9 01/22/2020    HGB 15.4 01/22/2020    HCT 46.4 01/22/2020    .0 (L) 01/22/2020    CREATSERUM 1.08 05/07/2020    BUN 19 (H) 05/07/2020    NA

## 2020-06-30 DIAGNOSIS — E06.3 HYPOTHYROIDISM DUE TO HASHIMOTO'S THYROIDITIS: ICD-10-CM

## 2020-06-30 DIAGNOSIS — E03.8 HYPOTHYROIDISM DUE TO HASHIMOTO'S THYROIDITIS: ICD-10-CM

## 2020-06-30 RX ORDER — LEVOTHYROXINE SODIUM 0.12 MG/1
TABLET ORAL
Qty: 90 TABLET | Refills: 0 | Status: SHIPPED | OUTPATIENT
Start: 2020-06-30 | End: 2020-10-18

## 2020-06-30 NOTE — TELEPHONE ENCOUNTER
Failed protocol     Last refill:  4/3/2020 Levothyroxine 125 mcg #90 NR    Last TSH - 12/3/2019     LOV:   1/20/2020 Dr Gwendlyn Jeans RTC PRN  No FOV scheduled

## 2020-08-19 ENCOUNTER — APPOINTMENT (OUTPATIENT)
Dept: LAB | Age: 65
End: 2020-08-19
Attending: RADIOLOGY
Payer: COMMERCIAL

## 2020-08-19 DIAGNOSIS — C61 MALIGNANT NEOPLASM OF PROSTATE (HCC): ICD-10-CM

## 2020-08-19 LAB — PSA SERPL-MCNC: 0.07 NG/ML (ref ?–4)

## 2020-08-19 PROCEDURE — 36415 COLL VENOUS BLD VENIPUNCTURE: CPT

## 2020-08-19 PROCEDURE — 84153 ASSAY OF PSA TOTAL: CPT

## 2020-08-21 ENCOUNTER — OFFICE VISIT (OUTPATIENT)
Dept: SURGERY | Facility: CLINIC | Age: 65
End: 2020-08-21
Payer: COMMERCIAL

## 2020-08-21 DIAGNOSIS — Z85.46 HISTORY OF PROSTATE CANCER: Primary | ICD-10-CM

## 2020-08-21 PROCEDURE — 99213 OFFICE O/P EST LOW 20 MIN: CPT | Performed by: UROLOGY

## 2020-08-21 NOTE — PROGRESS NOTES
Rooming Clinician:     Ankush Gates. is a 62year old male. Patient presents with:   Follow - Up: Eligard injection, 3 mo (?)        HPI:     The patient has a history of Britt 4+3 adenocarcinoma the prostate, clinical stage T2b with a left nodu reconstruction type   • LAPAROSCOPIC CHOLECYSTECTOMY N/A 3/29/2018    Performed by Era Lepe MD at [facility]   • PROSTATE BRACHYTHERAPY N/A 5/19/2020    Performed by Jonna Hernandez MD at [address] [place] [address]   • TOTAL HIP REPLACEMENT Manuel 01/22/2020    PT 14.4 06/24/2013       PSA:    Lab Results   Component Value Date    PSA 0.07 08/19/2020    PSA 14.60 ng/mL 10/25/2019       PSA Screen:    Lab Results   Component Value Date    PSAS 14.60 (H) 10/25/2019    PSAS 13.80 (H) 08/30/2018    PSAS

## 2020-08-27 NOTE — PROGRESS NOTES
[facility] RADIATION ONCOLOGY  TREATMENT SUMMARY     PATIENT:  Savannah De La Cruz.     REFERRING MD: Jaime Almendarez MD  DIAGNOSIS:  Prostate cancer    HISTORY   56-year-old man found to have clinical stage T2b, Britt score 4+3, PSA 14, adenocarc

## 2020-10-17 DIAGNOSIS — E03.8 HYPOTHYROIDISM DUE TO HASHIMOTO'S THYROIDITIS: ICD-10-CM

## 2020-10-17 DIAGNOSIS — E06.3 HYPOTHYROIDISM DUE TO HASHIMOTO'S THYROIDITIS: ICD-10-CM

## 2020-10-17 NOTE — TELEPHONE ENCOUNTER
Failed protocol - TSH value between 0.350 and 5.500 IU/ml - Zzish message sent to pt to contact office to schedule CPX.      Requesting LEVOTHYROXINE SODIUM 125 MCG Oral Tab  LOV: 1/20/20  RTC: PRN  Last Relevant Labs: 12/3/19  Filled: 6/30/20 #90 with 0

## 2020-10-18 RX ORDER — LEVOTHYROXINE SODIUM 0.12 MG/1
TABLET ORAL
Qty: 90 TABLET | Refills: 0 | Status: SHIPPED | OUTPATIENT
Start: 2020-10-18 | End: 2021-01-14

## 2020-11-12 RX ORDER — LISINOPRIL 20 MG/1
20 TABLET ORAL DAILY
Qty: 90 TABLET | Refills: 0 | Status: SHIPPED | OUTPATIENT
Start: 2020-11-12 | End: 2021-02-05

## 2020-11-12 NOTE — TELEPHONE ENCOUNTER
Last CPE 12/3/19    Future Appointments   Date Time Provider Tyrese Alvarez   12/18/2020  2:45 PM Madalyn Ibrahim MD [facility]   12/21/2020  8:30 AM Keisha Colvin MD [facility]   Med refilled, patient asked to schedule appt.

## 2020-11-12 NOTE — TELEPHONE ENCOUNTER
Miscellaneous   Danae RAMOS 10/30/20            Failed protocol     Last refill:  5/6/2020 Lisinopril 20 mg #30 5R    LOV:   1/20/2020 Dr Danae Jiang RTC PRN  2. HTN - Stable on prescription medication. No new issues.    No FOV scheduled

## 2020-12-07 ENCOUNTER — LAB ENCOUNTER (OUTPATIENT)
Dept: LAB | Age: 65
End: 2020-12-07
Attending: RADIOLOGY
Payer: COMMERCIAL

## 2020-12-07 DIAGNOSIS — C61 MALIGNANT NEOPLASM OF PROSTATE (HCC): ICD-10-CM

## 2020-12-07 PROCEDURE — 84153 ASSAY OF PSA TOTAL: CPT

## 2020-12-07 PROCEDURE — 36415 COLL VENOUS BLD VENIPUNCTURE: CPT

## 2020-12-07 NOTE — PROGRESS NOTES
Your recent PSA is 0.04 which is normal and lower than the previous PSA. Recommend follow up in the office as directed.     Sincerely,  Kirby Gipson MD

## 2020-12-18 ENCOUNTER — OFFICE VISIT (OUTPATIENT)
Dept: SURGERY | Facility: CLINIC | Age: 65
End: 2020-12-18
Payer: COMMERCIAL

## 2020-12-18 VITALS — SYSTOLIC BLOOD PRESSURE: 136 MMHG | HEART RATE: 88 BPM | TEMPERATURE: 97 F | DIASTOLIC BLOOD PRESSURE: 82 MMHG

## 2020-12-18 DIAGNOSIS — C61 PROSTATE CANCER (HCC): Primary | ICD-10-CM

## 2020-12-18 PROCEDURE — 99213 OFFICE O/P EST LOW 20 MIN: CPT | Performed by: UROLOGY

## 2020-12-18 PROCEDURE — 3079F DIAST BP 80-89 MM HG: CPT | Performed by: UROLOGY

## 2020-12-18 PROCEDURE — 3075F SYST BP GE 130 - 139MM HG: CPT | Performed by: UROLOGY

## 2020-12-18 NOTE — PROGRESS NOTES
Rooming Clinician:     Durga Odom. is a 60year old male. Patient presents with: Follow - Up: s/p seed implant 5/19/20.  c/o urgency  PSA 12/7/20  0.04      HPI:     The patient has a history of Britt 4+3 adenocarcinoma the prostate, clinical CHOLECYSTECTOMY N/A 3/29/2018    Performed by Jess Gonzalez MD at [facility]   • PROSTATE BRACHYTHERAPY N/A 5/19/2020    Performed by Kyle Jean MD at [facility]   • TOTAL HIP REPLACEMENT Bilateral    • [address] 11.40 (H) 10/09/2015    PSAS 7.88 (H) 09/10/2014    PSAS 7.81 (H) 12/23/2013       PSA Ultra Sensitivity:  No results found for: PSAULTRA      LAB:     Lab Results   Component Value Date    WBC 4.9 01/22/2020    HGB 15.4 01/22/2020    HCT 46.4 01/22/2020

## 2021-01-14 DIAGNOSIS — E03.8 HYPOTHYROIDISM DUE TO HASHIMOTO'S THYROIDITIS: ICD-10-CM

## 2021-01-14 DIAGNOSIS — E06.3 HYPOTHYROIDISM DUE TO HASHIMOTO'S THYROIDITIS: ICD-10-CM

## 2021-01-14 RX ORDER — LEVOTHYROXINE SODIUM 0.12 MG/1
TABLET ORAL
Qty: 30 TABLET | Refills: 0 | Status: SHIPPED | OUTPATIENT
Start: 2021-01-14 | End: 2021-02-05

## 2021-01-28 ENCOUNTER — NURSE ONLY (OUTPATIENT)
Dept: INTERNAL MEDICINE CLINIC | Facility: CLINIC | Age: 66
End: 2021-01-28
Payer: COMMERCIAL

## 2021-01-28 DIAGNOSIS — R73.03 PREDIABETES: ICD-10-CM

## 2021-01-28 DIAGNOSIS — Z00.00 LABORATORY EXAMINATION ORDERED AS PART OF A ROUTINE GENERAL MEDICAL EXAMINATION: Primary | ICD-10-CM

## 2021-01-28 LAB
AMB EXT CHOLESTEROL, TOTAL: 162 MG/DL
AMB EXT COVID-19 IGG: NEGATIVE
AMB EXT CREATININE: 1.11 MG/DL
AMB EXT GLUCOSE: 100 MG/DL
AMB EXT HEMATOCRIT: 41.3
AMB EXT HEMOGLOBIN: 13.9
AMB EXT HGBA1C: 5.9 %
AMB EXT LDL CHOLESTEROL, DIRECT: 94 MG/DL
AMB EXT MCV: 84.1
AMB EXT PLATELETS: 118
AMB EXT PSA SCREEN: 0.07 NG/ML
AMB EXT TRIGLYCERIDES: 97 MG/DL
AMB EXT TSH: 3.51 MIU/ML
AMB EXT WBC: 4.4 X10(3)UL
BILIRUB UR QL STRIP.AUTO: NEGATIVE
CLARITY UR REFRACT.AUTO: CLEAR
COLOR UR AUTO: YELLOW
CREAT UR-SCNC: 187 MG/DL
GLUCOSE UR STRIP.AUTO-MCNC: NEGATIVE MG/DL
KETONES UR STRIP.AUTO-MCNC: NEGATIVE MG/DL
LEUKOCYTE ESTERASE UR QL STRIP.AUTO: NEGATIVE
MICROALBUMIN UR-MCNC: 0.75 MG/DL
MICROALBUMIN/CREAT 24H UR-RTO: 4 UG/MG (ref ?–30)
NITRITE UR QL STRIP.AUTO: NEGATIVE
PH UR STRIP.AUTO: 5 [PH] (ref 4.5–8)
PROT UR STRIP.AUTO-MCNC: NEGATIVE MG/DL
RBC UR QL AUTO: NEGATIVE
SP GR UR STRIP.AUTO: 1.02 (ref 1–1.03)
UROBILINOGEN UR STRIP.AUTO-MCNC: <2 MG/DL

## 2021-01-28 PROCEDURE — 82043 UR ALBUMIN QUANTITATIVE: CPT | Performed by: INTERNAL MEDICINE

## 2021-01-28 PROCEDURE — 92551 PURE TONE HEARING TEST AIR: CPT | Performed by: INTERNAL MEDICINE

## 2021-01-28 PROCEDURE — 93000 ELECTROCARDIOGRAM COMPLETE: CPT | Performed by: INTERNAL MEDICINE

## 2021-01-28 PROCEDURE — 81003 URINALYSIS AUTO W/O SCOPE: CPT | Performed by: INTERNAL MEDICINE

## 2021-01-28 PROCEDURE — 82570 ASSAY OF URINE CREATININE: CPT | Performed by: INTERNAL MEDICINE

## 2021-01-28 NOTE — PROGRESS NOTES
*LOOKIN' BODY RESULTS    YES:       NO:x       REASON TEST NOT PERFORMED:Lookin body not working properly        HEIGHT: 6\". 1.5  WEIGHT:278.0 LB  _____________________________________________________________________________  Ray Jay    YES:x       N

## 2021-02-05 ENCOUNTER — OFFICE VISIT (OUTPATIENT)
Dept: INTERNAL MEDICINE CLINIC | Facility: CLINIC | Age: 66
End: 2021-02-05
Payer: COMMERCIAL

## 2021-02-05 ENCOUNTER — MED REC SCAN ONLY (OUTPATIENT)
Dept: INTERNAL MEDICINE CLINIC | Facility: CLINIC | Age: 66
End: 2021-02-05

## 2021-02-05 VITALS
SYSTOLIC BLOOD PRESSURE: 132 MMHG | DIASTOLIC BLOOD PRESSURE: 82 MMHG | TEMPERATURE: 98 F | RESPIRATION RATE: 16 BRPM | HEIGHT: 73.5 IN | OXYGEN SATURATION: 96 % | WEIGHT: 275 LBS | BODY MASS INDEX: 35.67 KG/M2 | HEART RATE: 78 BPM

## 2021-02-05 DIAGNOSIS — E55.9 VITAMIN D DEFICIENCY: ICD-10-CM

## 2021-02-05 DIAGNOSIS — N52.03 COMBINED ARTERIAL INSUFFICIENCY AND CORPORO-VENOUS OCCLUSIVE ERECTILE DYSFUNCTION: ICD-10-CM

## 2021-02-05 DIAGNOSIS — E06.3 HYPOTHYROIDISM DUE TO HASHIMOTO'S THYROIDITIS: ICD-10-CM

## 2021-02-05 DIAGNOSIS — E03.8 HYPOTHYROIDISM DUE TO HASHIMOTO'S THYROIDITIS: ICD-10-CM

## 2021-02-05 DIAGNOSIS — Z00.00 ROUTINE GENERAL MEDICAL EXAMINATION AT A HEALTH CARE FACILITY: Primary | ICD-10-CM

## 2021-02-05 DIAGNOSIS — E66.01 CLASS 2 SEVERE OBESITY DUE TO EXCESS CALORIES WITH SERIOUS COMORBIDITY AND BODY MASS INDEX (BMI) OF 35.0 TO 35.9 IN ADULT (HCC): ICD-10-CM

## 2021-02-05 DIAGNOSIS — I10 ESSENTIAL HYPERTENSION: ICD-10-CM

## 2021-02-05 DIAGNOSIS — Z23 NEED FOR SHINGLES VACCINE: ICD-10-CM

## 2021-02-05 DIAGNOSIS — R79.82 ELEVATED C-REACTIVE PROTEIN (CRP): ICD-10-CM

## 2021-02-05 DIAGNOSIS — R73.03 PREDIABETES: ICD-10-CM

## 2021-02-05 DIAGNOSIS — I87.2 CHRONIC VENOUS INSUFFICIENCY: ICD-10-CM

## 2021-02-05 DIAGNOSIS — C61 PROSTATE CANCER (HCC): ICD-10-CM

## 2021-02-05 DIAGNOSIS — K76.0 FATTY LIVER DISEASE, NONALCOHOLIC: ICD-10-CM

## 2021-02-05 DIAGNOSIS — D69.6 THROMBOCYTOPENIA (HCC): ICD-10-CM

## 2021-02-05 PROCEDURE — 90750 HZV VACC RECOMBINANT IM: CPT | Performed by: INTERNAL MEDICINE

## 2021-02-05 PROCEDURE — 90471 IMMUNIZATION ADMIN: CPT | Performed by: INTERNAL MEDICINE

## 2021-02-05 PROCEDURE — 3079F DIAST BP 80-89 MM HG: CPT | Performed by: INTERNAL MEDICINE

## 2021-02-05 PROCEDURE — 3008F BODY MASS INDEX DOCD: CPT | Performed by: INTERNAL MEDICINE

## 2021-02-05 PROCEDURE — 99396 PREV VISIT EST AGE 40-64: CPT | Performed by: INTERNAL MEDICINE

## 2021-02-05 PROCEDURE — 3075F SYST BP GE 130 - 139MM HG: CPT | Performed by: INTERNAL MEDICINE

## 2021-02-05 RX ORDER — LEVOTHYROXINE SODIUM 0.12 MG/1
125 TABLET ORAL
Qty: 90 TABLET | Refills: 3 | Status: SHIPPED | OUTPATIENT
Start: 2021-02-05 | End: 2021-09-19

## 2021-02-05 RX ORDER — IBUPROFEN 600 MG/1
600 TABLET ORAL EVERY 6 HOURS PRN
COMMUNITY
Start: 2020-11-13 | End: 2021-04-26 | Stop reason: ALTCHOICE

## 2021-02-05 RX ORDER — LISINOPRIL 20 MG/1
20 TABLET ORAL DAILY
Qty: 90 TABLET | Refills: 3 | Status: SHIPPED | OUTPATIENT
Start: 2021-02-05 | End: 2021-09-19

## 2021-02-05 NOTE — PATIENT INSTRUCTIONS
Understanding Chronic Venous Insufficiency  Problems with the veins in the legs may lead to chronic venous insufficiency (CVI). CVI means that there is a long-term problem with the veins not being able to pump blood back to your heart.  When this happens, · If you must stand or sit in one place for a period of time, keep your blood moving by wiggling your toes, shifting your body position, and rising up on the balls of your feet.     Elisha last reviewed this educational content on 10/1/2019  © [id number] T

## 2021-02-05 NOTE — PROGRESS NOTES
Patient presents with: Well Adult      HPI: Carolynn Morel presents for [facility] Annual Wellness Program. His health goals continue to center around longevity, vitality, and QOL.  He went for wellness labs via [facility]) dated 1/28/21 (see select results be breakfast., Disp: 90 tablet, Rfl: 3  •  lisinopril 20 MG Oral Tab, Take 1 tablet (20 mg total) by mouth daily. , Disp: 90 tablet, Rfl: 3  •  tamsulosin (FLOMAX) cap, Take 0.4 mg by mouth daily.   , Disp: , Rfl:   •  Loperamide HCl 2 MG Oral Cap, Take 2 mg by s2  Abd - soft, NABS, NT, ND  Ext - no c/c/e; (+) CVI changes and stasis dermatitis in the BLEs  Neuro - CNs 2-12 grossly intact, no focal deficits; 2+ DTRs  Psych - nl mood/affect       Labs (select via [facility] dated 1/28/21):  Hs-CRP 3.1    HDL 49  TG sensitivity c-reactive protein (hs-crp)  Thrombocytopenia (hcc)    1. Male CPX - Stable health, wellness labs as above. There are opportunities for improvement. 2. HM/Prev - Shingrix #1 given. He'll need #2 within 2-6 months.  In the meantime, I'd also lik which were then answered to the best of my ability. Johs Ag. Stu Jones MD  Diplomate, American Board of Internal Medicine  Member, [facility] ([email or web address])  Affiliate Physician,

## 2021-02-07 PROBLEM — E66.01 CLASS 2 SEVERE OBESITY DUE TO EXCESS CALORIES WITH SERIOUS COMORBIDITY AND BODY MASS INDEX (BMI) OF 35.0 TO 35.9 IN ADULT  (HCC): Status: ACTIVE | Noted: 2018-09-07

## 2021-02-07 PROBLEM — E66.01 CLASS 2 SEVERE OBESITY DUE TO EXCESS CALORIES WITH SERIOUS COMORBIDITY AND BODY MASS INDEX (BMI) OF 35.0 TO 35.9 IN ADULT (HCC): Status: ACTIVE | Noted: 2018-09-07

## 2021-02-07 PROBLEM — E66.01 CLASS 2 SEVERE OBESITY DUE TO EXCESS CALORIES WITH SERIOUS COMORBIDITY AND BODY MASS INDEX (BMI) OF 35.0 TO 35.9 IN ADULT: Status: ACTIVE | Noted: 2018-09-07

## 2021-02-07 PROBLEM — E66.812 CLASS 2 SEVERE OBESITY DUE TO EXCESS CALORIES WITH SERIOUS COMORBIDITY AND BODY MASS INDEX (BMI) OF 35.0 TO 35.9 IN ADULT (HCC): Status: ACTIVE | Noted: 2018-09-07

## 2021-02-10 ENCOUNTER — MED REC SCAN ONLY (OUTPATIENT)
Dept: INTERNAL MEDICINE CLINIC | Facility: CLINIC | Age: 66
End: 2021-02-10

## 2021-02-10 ENCOUNTER — TELEPHONE (OUTPATIENT)
Dept: INTERNAL MEDICINE CLINIC | Facility: CLINIC | Age: 66
End: 2021-02-10

## 2021-02-10 NOTE — TELEPHONE ENCOUNTER
Patient was here for CPE on 2/5/2021. OxLDL was pending at the time.  OxLDL  result is as follows:    Result: 44    Optimal range: <60  Moderate: 60-69  High: >70

## 2021-02-11 NOTE — TELEPHONE ENCOUNTER
Tell him that his OxLDL levels are normal, which is what I expected to happen when we was in for his visit. Keep focusing on lifestyle modification/optimization. Darrell Mcgowan.  Alyssa Kaur MD  Diplomate, [facility] Lifestyle [place] Board of Internal Medicine  Member, [employer]

## 2021-03-01 DIAGNOSIS — Z13.6 SCREENING FOR HEART DISEASE: Primary | ICD-10-CM

## 2021-03-18 DIAGNOSIS — Z23 NEED FOR VACCINATION: ICD-10-CM

## 2021-05-13 ENCOUNTER — HOSPITAL ENCOUNTER (OUTPATIENT)
Dept: CT IMAGING | Age: 66
Discharge: HOME | End: 2021-05-13
Attending: INTERNAL MEDICINE

## 2021-05-13 DIAGNOSIS — Z13.6 SCREENING FOR HEART DISEASE: ICD-10-CM

## 2021-06-07 ENCOUNTER — TELEPHONE (OUTPATIENT)
Dept: SURGERY | Facility: CLINIC | Age: 66
End: 2021-06-07

## 2021-06-07 DIAGNOSIS — R97.20 ELEVATED PSA: Primary | ICD-10-CM

## 2021-06-07 NOTE — TELEPHONE ENCOUNTER
PSA order has been placed per RP office visit notes 12/18. I called the pt and discussed this with them. Pt verbalized understanding and all questions were answered.

## 2021-06-07 NOTE — TELEPHONE ENCOUNTER
Patient requesting order for labs labs prior to appt 6/11. Please call at [phone],[employer].

## 2021-06-09 ENCOUNTER — LAB ENCOUNTER (OUTPATIENT)
Dept: LAB | Age: 66
End: 2021-06-09
Attending: INTERNAL MEDICINE
Payer: COMMERCIAL

## 2021-06-09 DIAGNOSIS — R97.20 ELEVATED PSA: ICD-10-CM

## 2021-06-09 PROCEDURE — 36415 COLL VENOUS BLD VENIPUNCTURE: CPT

## 2021-06-09 PROCEDURE — 84153 ASSAY OF PSA TOTAL: CPT

## 2021-06-09 NOTE — PROGRESS NOTES
Your recent PSA is 0.11 ng/mL and is considered normal after radiation therapy. Some slight increase may be attributed to slow return of androgen stimulation naturally. .  Recommend follow up in the office as directed.     Sincerely,  Isai Navarro MD

## 2021-06-11 ENCOUNTER — OFFICE VISIT (OUTPATIENT)
Dept: SURGERY | Facility: CLINIC | Age: 66
End: 2021-06-11
Payer: COMMERCIAL

## 2021-06-11 VITALS — SYSTOLIC BLOOD PRESSURE: 135 MMHG | DIASTOLIC BLOOD PRESSURE: 87 MMHG | HEART RATE: 69 BPM

## 2021-06-11 DIAGNOSIS — Z85.46 HISTORY OF PROSTATE CANCER: Primary | ICD-10-CM

## 2021-06-11 PROCEDURE — 3079F DIAST BP 80-89 MM HG: CPT | Performed by: UROLOGY

## 2021-06-11 PROCEDURE — 3075F SYST BP GE 130 - 139MM HG: CPT | Performed by: UROLOGY

## 2021-06-11 PROCEDURE — 99213 OFFICE O/P EST LOW 20 MIN: CPT | Performed by: UROLOGY

## 2021-06-11 NOTE — PROGRESS NOTES
Rooming Clinician:     Wood Cardona. is a 60year old male. Patient presents with:   Follow - Up: elevated PSA, Erectile Dysfunction, soft stools,     Erectile Dysfunction:  Frequency of erections: Abnormal  Quality of erections: 1 (10 is normal) Smokeless tobacco: Never Used      Tobacco comment: OCC CIGAR    Vaping Use      Vaping Use: Never used    Alcohol use:  Yes      Alcohol/week: 11.0 standard drinks      Types: 3 Glasses of wine, 4 Cans of beer, 4 Standard drinks or equivalent per week    D 08/30/2018    PSAS 11.80 (H) 11/04/2016    PSAS 11.40 (H) 10/09/2015    PSAS 7.88 (H) 09/10/2014       PSA Ultra Sensitivity:  No results found for: PSAULTRA      X-RAY[de-identified]         ASSESSMENT:     Prostate cancer status post combination brachytherapy, extern

## 2021-08-13 ENCOUNTER — LAB ENCOUNTER (OUTPATIENT)
Dept: LAB | Age: 66
End: 2021-08-13
Attending: RADIOLOGY
Payer: COMMERCIAL

## 2021-08-13 DIAGNOSIS — Z85.46 HISTORY OF PROSTATE CANCER: ICD-10-CM

## 2021-08-13 LAB — PSA SERPL-MCNC: 0.1 NG/ML (ref ?–4)

## 2021-08-13 PROCEDURE — 36415 COLL VENOUS BLD VENIPUNCTURE: CPT

## 2021-08-13 PROCEDURE — 84153 ASSAY OF PSA TOTAL: CPT

## 2021-08-13 RX ORDER — SILDENAFIL 100 MG/1
100 TABLET, FILM COATED ORAL
Qty: 30 TABLET | Refills: 1 | Status: SHIPPED | OUTPATIENT
Start: 2021-08-13 | End: 2021-09-19

## 2021-08-13 NOTE — TELEPHONE ENCOUNTER
1- Refill request on Sildenafil   2- Quantity of: 30 tabs  3- Instructions: PRN for ED  4- Last refill was on: 2/4/2020 for 30-tabs, 0-refill(s)  5- Last cpe w Dr Peraza Rim was 2/5/21

## 2021-08-26 ENCOUNTER — OFFICE VISIT (OUTPATIENT)
Dept: INTERNAL MEDICINE CLINIC | Facility: CLINIC | Age: 66
End: 2021-08-26
Payer: COMMERCIAL

## 2021-08-26 VITALS
BODY MASS INDEX: 37.39 KG/M2 | HEIGHT: 73 IN | SYSTOLIC BLOOD PRESSURE: 152 MMHG | WEIGHT: 282.13 LBS | DIASTOLIC BLOOD PRESSURE: 70 MMHG | HEART RATE: 82 BPM | TEMPERATURE: 98 F | RESPIRATION RATE: 16 BRPM | OXYGEN SATURATION: 98 %

## 2021-08-26 DIAGNOSIS — I87.2 CHRONIC VENOUS INSUFFICIENCY: ICD-10-CM

## 2021-08-26 DIAGNOSIS — I83.893 VARICOSE VEINS OF BOTH LOWER EXTREMITIES WITH COMPLICATIONS: Primary | ICD-10-CM

## 2021-08-26 PROCEDURE — 3008F BODY MASS INDEX DOCD: CPT | Performed by: INTERNAL MEDICINE

## 2021-08-26 PROCEDURE — 3077F SYST BP >= 140 MM HG: CPT | Performed by: INTERNAL MEDICINE

## 2021-08-26 PROCEDURE — 3078F DIAST BP <80 MM HG: CPT | Performed by: INTERNAL MEDICINE

## 2021-08-26 PROCEDURE — 99214 OFFICE O/P EST MOD 30 MIN: CPT | Performed by: INTERNAL MEDICINE

## 2021-08-26 NOTE — PROGRESS NOTES
Patient presents with:  Leg Swelling: leg circulation probles and discoleration   Ankle Pain: joint pain       HPI: Hermilo Finley presents today for eval of circulation issue. He has a longstanding hx of bilateral varicose veins in the lower extremities.  This has 11.0 standard drinks      Types: 3 Glasses of wine, 4 Cans of beer, 4 Standard drinks or equivalent per week    Drug use: No      PE:  /70 (BP Location: Right arm, Patient Position: Sitting, Cuff Size: adult)   Pulse 82   Temp 98.1 °F (36.7 °C) (Temp

## 2021-09-19 DIAGNOSIS — E06.3 HYPOTHYROIDISM DUE TO HASHIMOTO'S THYROIDITIS: ICD-10-CM

## 2021-09-19 DIAGNOSIS — E03.8 HYPOTHYROIDISM DUE TO HASHIMOTO'S THYROIDITIS: ICD-10-CM

## 2021-09-20 RX ORDER — SILDENAFIL 100 MG/1
100 TABLET, FILM COATED ORAL
Qty: 30 TABLET | Refills: 1 | Status: SHIPPED | OUTPATIENT
Start: 2021-09-20

## 2021-09-20 RX ORDER — SILDENAFIL 100 MG/1
100 TABLET, FILM COATED ORAL
Qty: 30 TABLET | Refills: 5 | OUTPATIENT
Start: 2021-09-20

## 2021-09-20 RX ORDER — LEVOTHYROXINE SODIUM 0.12 MG/1
125 TABLET ORAL
Qty: 90 TABLET | Refills: 1 | Status: SHIPPED | OUTPATIENT
Start: 2021-09-20

## 2021-09-20 RX ORDER — LISINOPRIL 20 MG/1
20 TABLET ORAL DAILY
Qty: 90 TABLET | Refills: 1 | Status: SHIPPED | OUTPATIENT
Start: 2021-09-20

## 2021-12-06 ENCOUNTER — LAB ENCOUNTER (OUTPATIENT)
Dept: LAB | Age: 66
End: 2021-12-06
Attending: RADIOLOGY
Payer: MEDICARE

## 2021-12-06 DIAGNOSIS — C61 MALIGNANT NEOPLASM OF PROSTATE (HCC): ICD-10-CM

## 2021-12-06 PROCEDURE — 84153 ASSAY OF PSA TOTAL: CPT

## 2021-12-06 PROCEDURE — 36415 COLL VENOUS BLD VENIPUNCTURE: CPT

## 2021-12-13 ENCOUNTER — OFFICE VISIT (OUTPATIENT)
Dept: SURGERY | Facility: CLINIC | Age: 66
End: 2021-12-13
Payer: MEDICARE

## 2021-12-13 DIAGNOSIS — C61 PROSTATE CANCER (HCC): ICD-10-CM

## 2021-12-13 DIAGNOSIS — R82.90 URINE FINDING: Primary | ICD-10-CM

## 2021-12-13 PROCEDURE — 51798 US URINE CAPACITY MEASURE: CPT | Performed by: UROLOGY

## 2021-12-13 PROCEDURE — 81003 URINALYSIS AUTO W/O SCOPE: CPT | Performed by: UROLOGY

## 2021-12-13 PROCEDURE — 99213 OFFICE O/P EST LOW 20 MIN: CPT | Performed by: UROLOGY

## 2021-12-13 NOTE — PROGRESS NOTES
Rooming Clinician:     Savannah De La Cruz. is a 73year old male. Patient presents with:   Follow - Up: 6 MONTHS FOLLOW UP ELEVATED PSA        HPI:     The patient has a history of [place] 4+3 adenocarcinoma the prostate, clinical stage T2b with a left Cans of beer, 4 Standard drinks or equivalent per week    Drug use: No       REVIEW OF SYSTEMS:     GENERAL HEALTH: feels well otherwise  SKIN: denies any unusual skin lesions or rashes  RESPIRATORY: denies shortness of breath with exertion  CARDIOVASCULAR PSAS 7.88 (H) 09/10/2014       PSA Ultra Sensitivity:  No results found for: PSAULTRA      X-RAY[de-identified]         ASSESSMENT:     History of clinical stage T2b Britt 4+3 adenocarcinoma the prostate status post combination external beam/brachytherapy and 6 month

## 2022-02-03 ENCOUNTER — LAB ENCOUNTER (OUTPATIENT)
Dept: LAB | Age: 67
End: 2022-02-03
Attending: RADIOLOGY
Payer: MEDICARE

## 2022-02-03 DIAGNOSIS — C61 PROSTATE CANCER (HCC): ICD-10-CM

## 2022-02-03 LAB — PSA SERPL-MCNC: 0.22 NG/ML (ref ?–4)

## 2022-02-03 PROCEDURE — 84153 ASSAY OF PSA TOTAL: CPT

## 2022-02-03 PROCEDURE — 36415 COLL VENOUS BLD VENIPUNCTURE: CPT

## 2022-02-10 ENCOUNTER — NURSE ONLY (OUTPATIENT)
Dept: INTERNAL MEDICINE CLINIC | Facility: CLINIC | Age: 67
End: 2022-02-10
Payer: MEDICARE

## 2022-02-10 DIAGNOSIS — R73.03 PREDIABETES: ICD-10-CM

## 2022-02-10 DIAGNOSIS — Z00.00 LABORATORY EXAMINATION ORDERED AS PART OF A ROUTINE GENERAL MEDICAL EXAMINATION: Primary | ICD-10-CM

## 2022-02-10 LAB
AMB EXT CHLORIDE: 106
AMB EXT CHOL/HDL RATIO: 2.6
AMB EXT CHOLESTEROL, TOTAL: 149 MG/DL
AMB EXT CMP ALT: 73 U/L
AMB EXT GLUCOSE: 98 MG/DL
AMB EXT HEMATOCRIT: 44.4
AMB EXT HEMOGLOBIN: 14.4
AMB EXT HGBA1C: 5.8 %
AMB EXT LDL CHOLESTEROL, DIRECT: 78 MG/DL
AMB EXT MCV: 85.5
AMB EXT NON HDL CHOL: 92 MG/DL
AMB EXT POSTASSIUM: 4 MMOL/L
AMB EXT SODIUM: 140 MMOL/L
AMB EXT TRIGLYCERIDES: 62 MG/DL
AMB EXT TSH: 4.06 MIU/ML
AMB EXT WBC: 6.9 X10(3)UL
BILIRUB UR QL STRIP.AUTO: NEGATIVE
CLARITY UR REFRACT.AUTO: CLEAR
COLOR UR AUTO: YELLOW
CREAT UR-SCNC: 268 MG/DL
GLUCOSE UR STRIP.AUTO-MCNC: NEGATIVE MG/DL
KETONES UR STRIP.AUTO-MCNC: NEGATIVE MG/DL
LEUKOCYTE ESTERASE UR QL STRIP.AUTO: NEGATIVE
MICROALBUMIN UR-MCNC: 1.84 MG/DL
MICROALBUMIN/CREAT 24H UR-RTO: 6.9 UG/MG (ref ?–30)
NITRITE UR QL STRIP.AUTO: NEGATIVE
PH UR STRIP.AUTO: 5 [PH] (ref 5–8)
PROT UR STRIP.AUTO-MCNC: NEGATIVE MG/DL
SP GR UR STRIP.AUTO: 1.03 (ref 1–1.03)
UROBILINOGEN UR STRIP.AUTO-MCNC: <2 MG/DL

## 2022-02-10 PROCEDURE — 82570 ASSAY OF URINE CREATININE: CPT | Performed by: INTERNAL MEDICINE

## 2022-02-10 PROCEDURE — 82043 UR ALBUMIN QUANTITATIVE: CPT | Performed by: INTERNAL MEDICINE

## 2022-02-10 PROCEDURE — 92551 PURE TONE HEARING TEST AIR: CPT | Performed by: INTERNAL MEDICINE

## 2022-02-10 PROCEDURE — 93923 UPR/LXTR ART STDY 3+ LVLS: CPT | Performed by: INTERNAL MEDICINE

## 2022-02-10 PROCEDURE — 99173 VISUAL ACUITY SCREEN: CPT | Performed by: INTERNAL MEDICINE

## 2022-02-10 PROCEDURE — 81001 URINALYSIS AUTO W/SCOPE: CPT | Performed by: INTERNAL MEDICINE

## 2022-02-10 NOTE — PROGRESS NOTES
*BODY COMPOSITION:    YES:x       NO:       REASON TEST NOT PERFORMED:   _____________________________________________________________________________  *VENIPUNCTURE    YES: x      NO:        REASON VENIPUNCTURE NOT PERFORMED:      LEFT A/C: x 1 stick landed    LEFT HAND:        RIGHT A/C:     RIGHT HAND:   __________________________________________________________________  *VISION    YES:x    NO:    REASON TEST NOT PERFORMED:  ________________________________________________________________________  *ANKLE BRACHIAL INDEX    YES;x      NO:    REASON TEST NOT PERFORMED:   ________________________________________________________________________  *URINE SAMPLE    YES:x    NO:    REASON NOT COLLECTED:

## 2022-02-24 ENCOUNTER — OFFICE VISIT (OUTPATIENT)
Dept: INTERNAL MEDICINE CLINIC | Facility: CLINIC | Age: 67
End: 2022-02-24
Payer: MEDICARE

## 2022-02-24 VITALS
HEART RATE: 59 BPM | SYSTOLIC BLOOD PRESSURE: 130 MMHG | RESPIRATION RATE: 16 BRPM | BODY MASS INDEX: 36.06 KG/M2 | DIASTOLIC BLOOD PRESSURE: 78 MMHG | OXYGEN SATURATION: 98 % | TEMPERATURE: 98 F | WEIGHT: 269.13 LBS | HEIGHT: 72.5 IN

## 2022-02-24 DIAGNOSIS — I10 PRIMARY HYPERTENSION: ICD-10-CM

## 2022-02-24 DIAGNOSIS — E03.8 HYPOTHYROIDISM DUE TO HASHIMOTO'S THYROIDITIS: ICD-10-CM

## 2022-02-24 DIAGNOSIS — R73.03 PREDIABETES: ICD-10-CM

## 2022-02-24 DIAGNOSIS — E66.01 CLASS 2 SEVERE OBESITY DUE TO EXCESS CALORIES WITH SERIOUS COMORBIDITY AND BODY MASS INDEX (BMI) OF 35.0 TO 35.9 IN ADULT (HCC): ICD-10-CM

## 2022-02-24 DIAGNOSIS — K76.0 FATTY LIVER DISEASE, NONALCOHOLIC: ICD-10-CM

## 2022-02-24 DIAGNOSIS — Z23 NEED FOR PROPHYLACTIC VACCINATION AGAINST STREPTOCOCCUS PNEUMONIAE (PNEUMOCOCCUS): ICD-10-CM

## 2022-02-24 DIAGNOSIS — Z85.46 HISTORY OF PROSTATE CANCER: ICD-10-CM

## 2022-02-24 DIAGNOSIS — N52.03 COMBINED ARTERIAL INSUFFICIENCY AND CORPORO-VENOUS OCCLUSIVE ERECTILE DYSFUNCTION: ICD-10-CM

## 2022-02-24 DIAGNOSIS — E06.3 HYPOTHYROIDISM DUE TO HASHIMOTO'S THYROIDITIS: ICD-10-CM

## 2022-02-24 DIAGNOSIS — I83.893 VARICOSE VEINS OF BOTH LOWER EXTREMITIES WITH COMPLICATIONS: ICD-10-CM

## 2022-02-24 DIAGNOSIS — Z00.00 ENCOUNTER FOR ANNUAL HEALTH EXAMINATION: Primary | ICD-10-CM

## 2022-02-24 DIAGNOSIS — I87.2 CHRONIC VENOUS INSUFFICIENCY: ICD-10-CM

## 2022-02-24 PROCEDURE — G0009 ADMIN PNEUMOCOCCAL VACCINE: HCPCS | Performed by: INTERNAL MEDICINE

## 2022-02-24 PROCEDURE — 90670 PCV13 VACCINE IM: CPT | Performed by: INTERNAL MEDICINE

## 2022-02-24 PROCEDURE — G0402 INITIAL PREVENTIVE EXAM: HCPCS | Performed by: INTERNAL MEDICINE

## 2022-02-26 PROBLEM — Z85.46 HISTORY OF PROSTATE CANCER: Status: ACTIVE | Noted: 2019-12-03

## 2022-03-10 RX ORDER — SILDENAFIL 100 MG/1
100 TABLET, FILM COATED ORAL
Qty: 30 TABLET | Refills: 1 | Status: SHIPPED | OUTPATIENT
Start: 2022-03-10

## 2022-03-11 ENCOUNTER — TELEPHONE (OUTPATIENT)
Dept: INTERNAL MEDICINE CLINIC | Facility: CLINIC | Age: 67
End: 2022-03-11

## 2022-03-11 NOTE — TELEPHONE ENCOUNTER
Joelle Medeiros MD sent to [facility] Clinical Staff  Please call Dar. Tell him that I've received communication from Dr. Dominic Crum. I'm told that Dr. Dominic Crum recommends Dr. Mani Newton who is an ED specialist. Since Dr. Dominic Crum is recommending Dr. Coy Birmingham, then I will endorse his recommendation as well. LvM to call office. Dr.Kansal Lombard  [address] [place] [address].   RYANNE/ [address], [address] [facility]    [phone]

## 2022-03-17 NOTE — TELEPHONE ENCOUNTER
Spoke with Many relayed information and sent a Mychart message with the information as well. Many agrees and verbalized understanding.

## 2022-04-25 RX ORDER — LISINOPRIL 20 MG/1
20 TABLET ORAL DAILY
Qty: 90 TABLET | Refills: 3 | Status: SHIPPED | OUTPATIENT
Start: 2022-04-25

## 2022-04-25 NOTE — TELEPHONE ENCOUNTER
Lisinopril 20mg 1 tab daily filled 9/20/21 90 with 1 refill     LOV 2/24/22  Return in about 6 months (around 8/24/2022).    No upcoming apt on file   Labs 2/10/22

## 2022-05-02 ENCOUNTER — TELEPHONE (OUTPATIENT)
Dept: INTERNAL MEDICINE CLINIC | Facility: CLINIC | Age: 67
End: 2022-05-02

## 2022-05-05 RX ORDER — LEVOTHYROXINE SODIUM 0.12 MG/1
125 TABLET ORAL
Qty: 90 TABLET | Refills: 3 | Status: SHIPPED | OUTPATIENT
Start: 2022-05-05

## 2022-05-05 NOTE — TELEPHONE ENCOUNTER
Apt from 2/24 w Dr Kwan Mcdowell:  3. HypoTSH secondary to Hashimoto's thyroiditis - Stable on prescription medication + lifestyle measures. No new issues.

## 2022-06-16 NOTE — ANESTHESIA POSTPROCEDURE EVALUATION
MEDICARE WELLNESS VISIT + NOTE    CHIEF COMPLAINT:  Светлана Paez presents for her Subsequent Annual Medicare Wellness Visit.   Her additional complaints or concerns are addressed below.      Patient Care Team:  Brooke Santiago MD as PCP - General (Internal Medicine)  Willy Arango MD as Referring Provider (Neurology)  Truong Hamilton MD as Referring Provider (Gastroenterology)  Augusta Harmon MD as Referring Provider (Urology)  Bebeto Camarena MD as Referring Provider (Orthopedic Surgery)  Aquiles Solorzano MD as Referring Provider (Orthopaedic Surgery Sports Medicine)  Lisa Avalos MD as Referring Provider (Dermatology)  Dimitri Chun MD as Referring Provider (ENT/Otolaryngology)  Marcelino Pollock MD (Neurological Surgery)  Truong Vaughan MD (Vascular Surgery)        Patient Active Problem List   Diagnosis   • Benign hypertension   • Familial hypercholesterolemia   • Chronic fatigue   • Fibromyalgia   • Hemiparesis affecting right side as late effect of cerebrovascular accident (CMS/[facility])   • Hypothyroidism due to Hashimoto's thyroiditis   • IFG (impaired fasting glucose)   • H/O cerebral parenchymal hemorrhage   • Extrinsic asthma, mild intermittent, uncomplicated   • Pain in right axilla   • Left thyroid nodule   • Mixed stress and urge urinary incontinence   • Vision disturbance   • Wellness examination   • Fatty infiltration of liver   • Vitamin D deficiency   • Tremor   • Constipation   • Status post replacement of right shoulder joint   • Mastodynia of right breast   • Osteopenia of multiple sites   • Vitamin B12 deficiency (non anemic)   • Right cervical radiculopathy   • Non-seasonal allergic rhinitis due to pollen   • Dysthymia   • Class 2 severe obesity due to excess calories with serious comorbidity and body mass index (BMI) of 36.0 to 36.9 in adult (CMS/[facility])   • Rt groin pain         Past Medical History:   Diagnosis Date   • Benign hypertension 3/16/2016   • Closed supracondylar fracture  [facility].  Patient Status:  Outpatient in a Bed   Age/Gender 65year old male MRN [id number]   [facility] SURGERY Attending Tomas Horn MD   Hosp Day # 0 PCP Carolyn Goodwin MD       Anesthesia Post-op Note    Proce of right humerus with routine healing 11/03/2020   • Extrinsic asthma, mild intermittent, uncomplicated 1/27/2015   • Familial hypercholesterolemia 3/16/2016   • Fatty infiltration of liver 10/8/2019   • Fibromyalgia 1/27/2015   • Hemiparesis affecting right side as late effect of cerebrovascular accident (CMS/HCC) 1/9/2018   • Hypothyroidism due to Hashimoto's thyroiditis 1/27/2015   • IFG (impaired fasting glucose) 3/16/2016   • Left thyroid nodule 4/22/2015    Sub centimeter, benign appearing    • Nontraumatic subcortical hemorrhage of left cerebral hemisphere (CMS/HCC) 5/24/2017   • Pain in right axilla 5/8/2018   • Seizure (CMS/[facility])    • Shoulder dislocation    • Status post replacement of right shoulder joint 1/22/2021    Done 1/22/21   • Stroke (CMS/HCC)    • TIA (transient ischemic attack)          Past Surgical History:   Procedure Laterality Date   • Hysterectomy  1992   • Tonsillectomy  1957   • Total shoulder arthroplasty Right 01/22/2021    reverse R shoulder arthroplasty          Social History     Tobacco Use   • Smoking status: Never Smoker   • Smokeless tobacco: Never Used   Vaping Use   • Vaping Use: never used   Substance Use Topics   • Alcohol use: Not Currently   • Drug use: No     Family History   Problem Relation Age of Onset   • Cirrhosis Mother         fatty liver   • Diabetes Mother    • Kidney disease Mother         diabetes   • Alcohol Abuse Father    • Cirrhosis Father    • Depression Sister    • Hyperlipidemia Sister    • Hypertension Sister    • Stroke Sister    • Myocardial Infarction Maternal Grandfather    • Cirrhosis Paternal Grandfather    • Motor Vehicle Accident Brother    • Diabetes Brother    • Cancer, Kidney Son 51         Current Outpatient Medications   Medication Sig Dispense Refill   • famotidine (PEPCID) 20 MG tablet Take 20 mg by mouth daily.     • fluticasone (FLONASE) 50 MCG/ACT nasal spray Spray 2 sprays in each nostril daily.     • atorvastatin (LIPITOR) 40 MG  tablet Take 1 tablet by mouth daily. 90 tablet 3   • atorvastatin (LIPITOR) 20 MG tablet Take 1 tablet by mouth daily. 90 tablet 1   • Vitamin D, Ergocalciferol, 1.25 mg (50,000 units) capsule Take 1 capsule by mouth every 14 days. (Patient taking differently: Take 1.25 mg by mouth every 30 days.) 6 capsule 1   • vitamin B-12 (CYANOCOBALAMIN) 1000 MCG tablet Take 1 tablet by mouth daily. 90 tablet 1   • levothyroxine 50 MCG tablet Take 1 tablet by mouth daily. 90 tablet 3   • ARTIFICIAL TEAR OP      • omeprazole (PrilOSEC) 20 MG capsule      • aspirin 81 MG chewable tablet 81 mg daily.     • Menthol, Topical Analgesic, 4 % Gel 4 %. About 2 x weekly     • lisinopril (ZESTRIL) 5 MG tablet Take 5 mg by mouth daily.       No current facility-administered medications for this visit.      Unable to reconcile meds as pt brings no med list and cannot remember her meds.      The following items on the Medicare Health Risk Assessment were found to be positive  1.) Do you have an Advance directive, living will, or power of  for health care document that contains your wishes for end of life care?: No     2.) Would you like additional information on advance directives?: Yes     3.) During the past 4 weeks, how would you rate your health?: Poor     4.) During the past 4 weeks, what was the hardest physical activity you could do for at least 2 minutes?: Very Light     6 b.) How many servings of High Fiber / Whole Grain Foods to you have each day ( 1 serving = 1 cup cold cereal, 1/2 cup cooked cereal, 1 slice bread): 1 per day     7b.) Do you feel unsteady when standing or walking?: Yes     7c.) Do you worry about falling?: Yes     11a.) Bladder Control problems (urine leaking): Always     11d.) Bodily pain: Always     11e.) Tiredness or Fatigue: Always         Vision and Hearing screens:    Hearing Screening    125Hz 250Hz 500Hz 1000Hz 2000Hz 3000Hz 4000Hz 6000Hz 8000Hz   Right ear:            Left ear:             Comments: Pt can hear bilat finger rub.     Visual Acuity Screening    Right eye Left eye Both eyes   Without correction: 20/50 20/50 20/50   With correction:          Advance Directive:   The patient has the following documents:  No Advance Directives on file. Patient offered documents.   POLST on file (full code) reviewed - no changes needed  Forms for POA/ LW provided.     Cognitive/Functional Status: no evidence of cognitive dysfunction by direct observation and Mini-Cog performed with score of 5    Opioid Review: Светлана is not taking opioid medications.    Recent PHQ 2/9 Score:    PHQ 2:  Date Adult PHQ 2 Score Adult PHQ 2 Interpretation   6/16/2022 0 No further screening needed       PHQ 9:       DEPRESSION ASSESSMENT/PLAN:  Depression screening is negative no further plan needed.     Body mass index is 36.49 kg/m².    BMI ASSESSMENT/PLAN:  Patient is obese.    Journal food intake daily, Recommend nutrition support group (i.e. Weight Watchers, etc.)  and 30 minutes of physical activity a day        See Patient Instructions section.   Return in about 6 months (around 12/16/2022) for fasting follow up with MD - Johnathon.      OUTPATIENT PROGRESS NOTE    Subjective   Chief Complaint Medicare Wellness Visit    COMPREHENSIVE EXAM       H/o HTN, HLD fibromyalgia, IFG, asthma, hypothyroidism/ Hashimoto's/  thyroid nodule, obesity/ fatty liver, urinary incont.   H/o chronic R axillary pain/ unknown etiology.  Mild cervical radiculopathy, R hemiparesis and visual disturbance s/p CVA and intraparenchymal hemorrhage, tremor/movement disorder, B12 def, FM, osteopenia/ vit D def     Last time, ator increased to 40 mg daily   Pt not certain she is taking this dose - but a call to her facility confirms she has been on this dose.     She continues to have pain in the R axilla, diagnosis/ cause is elusive  Has seen neuro/ ortho/ neurosurg  Breaks out in rashes/ localized swelling in the area - has seen derm and allergy for this.      Pt continues to have no control over her bladder - she was seen by urology, but she is having a difficult time finding one who will take Medicaid.   She also has chronic constipation  Using miralax once daily, hasn't increased dose beyond this.       No recurrent bleeding from umbilicus since 2/2022      Will be seeing Dr Miranda/ tonya later this month for fibromyalgia re-eval.     Pt considering seeing a surgeon for lymphatic bypass for her fluid accumulation under her L arm/ breast    Saw Dr Smalls - pt requested Dr Smalls do an u/s to help with the pain  Seeing a chiro  Had an u/s therapy on her R medial thumb and notes the thumb was instantaneously less painful and remains less painful.     Also c/o R \"hip\" pain in ant groin area for the last 4-6 months  Not changing over time, but comes and goes  Worse w/ changing position, worse w/ sitting from supine position  Worse w/ bearing weight on R leg or walking  No h/o injury.   No change /w BM's.              Medications  Medications were reviewed and updated today.    Histories  I have personally reviewed and updated the patient's past medical, past surgical, family and social histories during today's visit.    Review of Systems   Constitutional: Negative for activity change, appetite change, chills, diaphoresis, fatigue, fever and unexpected weight change.   HENT: Positive for rhinorrhea. Negative for dental problem, hearing loss, sinus pain, sore throat and trouble swallowing.    Eyes: Positive for visual disturbance. Negative for pain.   Respiratory: Negative for cough, chest tightness, shortness of breath and wheezing.    Cardiovascular: Negative for chest pain, palpitations and leg swelling.   Gastrointestinal: Negative for abdominal pain, blood in stool, constipation, diarrhea, nausea and vomiting.   Endocrine: Negative for cold intolerance and heat intolerance.   Genitourinary: Positive for difficulty urinating (incontinence). Negative for dysuria, frequency and  hematuria.   Musculoskeletal: Positive for arthralgias, gait problem and myalgias. Negative for back pain, joint swelling and neck stiffness.        R arm/ armpit pain/ shoulder pain  Ambulates w/ a cane   Skin: Negative for rash and wound.   Allergic/Immunologic: Negative for environmental allergies.   Neurological: Positive for tremors. Negative for dizziness, seizures, syncope, weakness, light-headedness, numbness and headaches.        States has very few HA except when she cries.      Hematological: Negative for adenopathy. Does not bruise/bleed easily.   Psychiatric/Behavioral: Negative for confusion, dysphoric mood and sleep disturbance. The patient is not nervous/anxious.        Objective   Visit Vitals  /72   Pulse 90   Temp 97.2 °F (36.2 °C) (Temporal)   Ht 5' 0.79\" (1.544 m)   Wt 87 kg (191 lb 12.8 oz)   SpO2 97%   BMI 36.49 kg/m²     Physical Exam  Vitals reviewed.   Constitutional:       General: She is not in acute distress.     Appearance: Normal appearance. She is well-developed. She is obese. She is not ill-appearing or diaphoretic.   HENT:      Head: Normocephalic and atraumatic.      Right Ear: Tympanic membrane, ear canal and external ear normal.      Left Ear: Tympanic membrane, ear canal and external ear normal.      Nose: Nose normal.      Mouth/Throat:      Mouth: Mucous membranes are moist.   Eyes:      General: No scleral icterus.     Extraocular Movements: Extraocular movements intact.      Conjunctiva/sclera: Conjunctivae normal.      Pupils: Pupils are equal, round, and reactive to light.   Neck:      Thyroid: No thyromegaly.   Cardiovascular:      Rate and Rhythm: Normal rate and regular rhythm.      Pulses: Normal pulses.      Heart sounds: Normal heart sounds.   Pulmonary:      Effort: Pulmonary effort is normal. No respiratory distress.      Breath sounds: Normal breath sounds. No wheezing or rales.   Chest:   Breasts:      Right: Normal. No supraclavicular adenopathy.       Left: Normal. No axillary adenopathy or supraclavicular adenopathy.        Comments: Unable to examine R axilla, pt unable to coop w/ exam  Abdominal:      General: Bowel sounds are normal. There is no distension.      Palpations: Abdomen is soft. There is no mass.      Tenderness: There is abdominal tenderness. There is no right CVA tenderness, guarding or rebound.      Hernia: No hernia is present.          Comments: obese   Genitourinary:     Comments: Deferred by pt  Musculoskeletal:         General: No tenderness or deformity.      Cervical back: Normal range of motion. No rigidity. No muscular tenderness.      Right lower leg: No edema.      Left lower leg: No edema.      Comments: ? Early contractures in wrist/ fingers on R - able to extend when forced.   R hip int rotation diminished, pt states this may reproduce her sx.    Lymphadenopathy:      Cervical: No cervical adenopathy.      Upper Body:      Right upper body: No supraclavicular adenopathy.      Left upper body: No supraclavicular or axillary adenopathy.   Skin:     General: Skin is warm and dry.      Capillary Refill: Capillary refill takes less than 2 seconds.      Findings: No lesion or rash.   Neurological:      Mental Status: She is alert and oriented to person, place, and time. Mental status is at baseline.      Cranial Nerves: No cranial nerve deficit.      Motor: Weakness present.      Coordination: Coordination abnormal.      Gait: Gait abnormal.      Deep Tendon Reflexes: Reflexes abnormal.      Comments: RUE w/ choreiform tremor, more violent shaking w/ intension - difficulty using R hand.   Strength UE, LE's good, some prox RUE weakness - mild  Coordination a bit off - difficulty ambulating  Using a cane  DTR's hyperreflexic on R  Toes upgoing on L  Slow difficult gait/ shaky, not sure of self.   Psychiatric:         Mood and Affect: Mood normal.         Behavior: Behavior normal.         PROCEDURE:  CT ABDOMEN PELVIS W CONTRAST      COMPARISON:  6/2/2009     INDICATIONS:  Right lower quadrant abdominal pain     TECHNIQUE:     CT images were obtained with intravenous iodinated contrast.  Automated exposure control and iterative dose reconstruction were utilized to minimize radiation dose.     FINDINGS:     LIVER:   No suspicious liver lesion is seen. The portal and hepatic veins are patent.   BILIARY:   No biliary duct dilation. The biliary system is otherwise unremarkable.   PANCREAS:   There is a punctate calcification in the inferior aspect of the pancreatic head, similar to 2009, which could reflect sequela of prior pancreatitis.  No pancreatic ductal dilatation detected.   SPLEEN:   No suspicious splenic lesion is seen. The spleen is normal in size.   KIDNEYS:   No hydronephrosis or obstructing nephrolithiasis detected.   ADRENALS:   No adrenal gland nodule or thickening.   AORTA/VASCULAR:   No aneurysm. The proximal SMA appears grossly opacified with intravenous contrast.   RETROPERITONEUM:   No lymphadenopathy.   BOWEL/MESENTERY:   Small bowel normal in caliber.  There is a tiny vermiform retrocecal structure which is thought to represent a nondilated appendix.  No pericolonic inflammatory changes detected.  There is a moderate amount of formed stool within the colon.   ABDOMINAL WALL:   There is mild diastasis of the midline anterior abdominal wall musculature at the level of the umbilicus, with closely apposed underlying bowel loops.   URINARY BLADDER:   No focal wall thickening or calculus.   PELVIC ORGANS:   Uterus is atrophic or surgically absent.   BONES:   There is trace anterolisthesis of L4 and L5, without pars defects.  Degenerative changes of the spine, most pronounced at L5-S1.   LUNG BASES:   Minimal dependent atelectasis.   OTHER:   No free air detected.     CONCLUSION:     1.  No evidence of bowel obstruction, appendicitis, or free air.   2.  Moderate amount of formed stool within the colon, which could reflect an  element of constipation.   3.  Additional incidental and degenerative findings as above.     FINAL REPORT   Attending Radiologist:  iJmbo Nieto MD   Date Signed Off:  02/24/2022 17:07      EKG - NSR, no change from 2020      Laboratory  I have reviewed the pertinent laboratory tests. These are the pertinent findings:  No new labs    Imaging  I have reviewed the pertinent imaging study reports. These are the pertinent findings:  CT sinuses done elsewhere 5/2022 nl - reviewed and evaluated    Assessment & Plan   Diagnoses and associated orders for this visit:  1. Wellness examination  Assessment & Plan:  Vaccines:  Flu shot in the fall.  Shingrix is the new shingles vaccine; a two shot series 2-6 months apart.  Not covered by Medicare.  If you obtain the vaccine at a pharmacy, please call with dates of administration. Hepatitis B vaccination recommended for pts w/ fatty liver, not covered by Medicare.   Labs:  fasting today  Pap:  No cervix  Mammogram:  Due in November   Colonoscopy:  Repeat 2023  DEXA: due 7/2023  Daily aerobic exercise advised.  Three dairy equivalents daily recommended for bone health, if not possible, use calcium supplements to take in a total of 1200 mg calcium daily  Aspirin is indicated    See a dentist!    Orders:  -     ANNUAL WELLNESS VISIT SUBSEQUENT VISIT W PPS  2. Familial hypercholesterolemia  Assessment & Plan:  Recheck on atorva 40, fasting.     Orders:  -     PREV EST AGE >64  -     Lipid Panel With Reflex  -     Comprehensive Metabolic Panel  -     Comprehensive Metabolic Panel  -     Lipid Panel With Reflex  3. Benign hypertension  Assessment & Plan:  HYPERTENSION...  control at present:  ok  medication plan:  No change  Lifestyle management:  get weight down, regular aerobic exercise, limit alcohol  Diet:  low sodium  discussed at length    Orders:  -     Electrocardiogram (ECG)  -     PREV EST AGE >64  -     CBC with Automated Differential  -     CBC with Automated  Differential  4. Vitamin D deficiency  Assessment & Plan:  Recheck on monthly Rx vit D  Orders:  -     PREV EST AGE >64  -     Vitamin D -25 Hydroxy  -     Vitamin D -25 Hydroxy  5. Vitamin B12 deficiency (non anemic)  Assessment & Plan:  Recheck on 1000 mcg daily   This may be related to omeprazole use  B12 def can lead to bad tastes in the mouth.   Orders:  -     PREV EST AGE >64  -     Vitamin B12 And Folate  -     Vitamin B12 And Folate  6. IFG (impaired fasting glucose)  Assessment & Plan:  Avoiding simple carbs, getting in daily exercise and losing weight are ways to prevent the progression to diabetes.       Orders:  -     PREV EST AGE >64  -     Glycohemoglobin  -     Glycohemoglobin  7. Hypothyroidism due to Hashimoto's thyroiditis  Assessment & Plan:  Recheck level on 50 mcg of levothyroxine.     Orders:  -     PREV EST AGE >64  -     Thyroid Stimulating Hormone Reflex  -     Thyroid Stimulating Hormone Reflex  8. Class 2 severe obesity due to excess calories with serious comorbidity and body mass index (BMI) of 36.0 to 36.9 in adult (CMS/[facility])  Assessment & Plan:  Pt has gained wt  Pt not sleeping well  Frequently fatigued  Has h/o CVA  Will refer for sleep study.     Orders:  -     SERVICE TO SLEEP MEDICINE  -     PREV EST AGE >64  9. Fatty infiltration of liver  Assessment & Plan:  Fatty liver on MRI  Consider fibroscan if LFTs are up  Weight loss helps  Hep B vaccination recommended, but not covered by medicare.   Orders:  -     PREV EST AGE >64  10. Chronic idiopathic constipation  Assessment & Plan:  Pt w/ chronic constipation, likely contributing to her bladder problems.  Miralax recommended  Can increase to twice or 3x daily if needed.   Colonoscopy due next year.   Orders:  -     PREV EST AGE >64  11. Osteopenia of multiple sites  Assessment & Plan:  ...  DEXA due 7/2023  daily wt bearing exercise encouraged   calcium in form of 3 dairy equivalents daily encouraged...if not able, then calcium  500-600 mg twice daily with food  Vitamin D level recently low - increase dose to 5000 units  Medication:  FRAX score calculated today indicates no treatment indicated.   Discussed at length    Orders:  -     PREV EST AGE >64  12. Mastodynia of right breast  Assessment & Plan:  Breast exam is unremarkable, but pt has intermittent pain in the upper outer R breast and in sternal area and beneath R breast  This is stable over several years yrs  Orders:  -     PREV EST AGE >64  13. Fibromyalgia  Assessment & Plan:  Fibromyalgia may be aggravating RUE pain  May consider duloxetine, but this may aggravate her RUE movement disorder.  Will be seeing rheumatology for eval soon. .   Orders:  -     PREV EST AGE >64  14. Hemiparesis affecting right side as late effect of cerebrovascular accident (CMS/[facility])  Assessment & Plan:  Continue aspirin, statin, BP control  Weakness comes and goes depending on the day per history  Keeping active helps.     Orders:  -     PREV EST AGE >64  15. Extrinsic asthma, mild intermittent, uncomplicated  Assessment & Plan:  Hasn't used rescue inhaler in over 2 yrs  No respiratory complaints  Likely not a problem unless gets infected or goes out in the cold.       Orders:  -     PREV EST AGE >64  16. Screening mammogram for high-risk patient  -     MAMMO SCREENING BILATERAL W TRAVIS  -     ANNUAL WELLNESS VISIT SUBSEQUENT VISIT W PPS  17. Medicare annual wellness visit, subsequent  -     ANNUAL WELLNESS VISIT SUBSEQUENT VISIT W PPS  18. Encounter for preventive care  -     PREV EST AGE >64  19. Rt groin pain  Assessment & Plan:  R \"hip\" pain in ant groin area for the last 4-6 months  Not changing over time, but comes and goes  Worse w/ changing position, worse w/ sitting from supine position  Worse w/ bearing weight on R leg or walking  No h/o injury.   No change /w BM's.   R/o hip pathology  R/o hernia, none seen on recent CT abd elsewhere - reviewed and evaluated.     Orders:  -     XR HIP 2 VIEWS  RIGHT W PELVIS 1 VIEW  -     US HERNIA ABDOMEN  -     Urinalysis With Microscopy Exam W/O C/S  -     Urinalysis With Microscopy Exam W/O C/S      I spent a total of 75 minutes on the day of the visit.  This includes time for wellness - 15 min, time for preventative 10 min, time for problem management 50 min. .

## 2022-06-20 ENCOUNTER — OFFICE VISIT (OUTPATIENT)
Dept: SURGERY | Facility: CLINIC | Age: 67
End: 2022-06-20
Payer: MEDICARE

## 2022-06-20 DIAGNOSIS — Z85.46 HISTORY OF PROSTATE CANCER: ICD-10-CM

## 2022-06-20 DIAGNOSIS — R82.90 URINE FINDING: Primary | ICD-10-CM

## 2022-06-20 DIAGNOSIS — C61 PROSTATE CANCER (HCC): ICD-10-CM

## 2022-06-20 LAB
APPEARANCE: CLEAR
BILIRUBIN: NEGATIVE
GLUCOSE (URINE DIPSTICK): NEGATIVE MG/DL
KETONES (URINE DIPSTICK): NEGATIVE MG/DL
LEUKOCYTES: NEGATIVE
MULTISTIX LOT#: ABNORMAL NUMERIC
NITRITE, URINE: NEGATIVE
PH, URINE: 7 (ref 4.5–8)
PROTEIN (URINE DIPSTICK): NEGATIVE MG/DL
SPECIFIC GRAVITY: 1.02 (ref 1–1.03)
URINE-COLOR: YELLOW
UROBILINOGEN,SEMI-QN: 2 MG/DL (ref 0–1.9)

## 2022-06-20 PROCEDURE — 99213 OFFICE O/P EST LOW 20 MIN: CPT | Performed by: UROLOGY

## 2022-06-20 PROCEDURE — 81003 URINALYSIS AUTO W/O SCOPE: CPT | Performed by: UROLOGY

## 2022-07-05 ENCOUNTER — LAB ENCOUNTER (OUTPATIENT)
Dept: LAB | Age: 67
End: 2022-07-05
Attending: UROLOGY
Payer: MEDICARE

## 2022-07-05 DIAGNOSIS — C61 PROSTATE CANCER (HCC): ICD-10-CM

## 2022-07-05 LAB — PSA SERPL-MCNC: 0.33 NG/ML (ref ?–4)

## 2022-07-05 PROCEDURE — 84153 ASSAY OF PSA TOTAL: CPT

## 2022-07-05 PROCEDURE — 36415 COLL VENOUS BLD VENIPUNCTURE: CPT

## 2022-07-21 ENCOUNTER — TELEPHONE (OUTPATIENT)
Dept: SURGERY | Facility: CLINIC | Age: 67
End: 2022-07-21

## 2022-08-10 DIAGNOSIS — E03.8 HYPOTHYROIDISM DUE TO HASHIMOTO'S THYROIDITIS: ICD-10-CM

## 2022-08-10 DIAGNOSIS — E06.3 HYPOTHYROIDISM DUE TO HASHIMOTO'S THYROIDITIS: ICD-10-CM

## 2022-08-10 RX ORDER — LEVOTHYROXINE SODIUM 0.12 MG/1
125 TABLET ORAL
Qty: 90 TABLET | Refills: 3 | Status: SHIPPED | OUTPATIENT
Start: 2022-08-10

## 2022-09-21 PROBLEM — Z86.0100 PERSONAL HISTORY OF COLONIC POLYPS: Status: ACTIVE | Noted: 2022-09-21

## 2022-09-21 PROBLEM — Z86.010 PERSONAL HISTORY OF COLONIC POLYPS: Status: ACTIVE | Noted: 2022-09-21

## 2022-10-13 ENCOUNTER — OFFICE VISIT (OUTPATIENT)
Dept: INTERNAL MEDICINE CLINIC | Facility: CLINIC | Age: 67
End: 2022-10-13
Payer: MEDICARE

## 2022-10-13 VITALS
WEIGHT: 274.88 LBS | RESPIRATION RATE: 16 BRPM | OXYGEN SATURATION: 98 % | HEART RATE: 80 BPM | DIASTOLIC BLOOD PRESSURE: 84 MMHG | TEMPERATURE: 98 F | HEIGHT: 73 IN | BODY MASS INDEX: 36.43 KG/M2 | SYSTOLIC BLOOD PRESSURE: 144 MMHG

## 2022-10-13 DIAGNOSIS — R40.0 DAYTIME SOMNOLENCE: ICD-10-CM

## 2022-10-13 DIAGNOSIS — R06.81 WITNESSED EPISODE OF APNEA: ICD-10-CM

## 2022-10-13 DIAGNOSIS — G93.32 CHRONIC FATIGUE SYNDROME: ICD-10-CM

## 2022-10-13 DIAGNOSIS — I10 PRIMARY HYPERTENSION: Primary | ICD-10-CM

## 2022-10-13 DIAGNOSIS — E06.3 HYPOTHYROIDISM DUE TO HASHIMOTO'S THYROIDITIS: ICD-10-CM

## 2022-10-13 DIAGNOSIS — R73.03 PREDIABETES: ICD-10-CM

## 2022-10-13 DIAGNOSIS — E03.8 HYPOTHYROIDISM DUE TO HASHIMOTO'S THYROIDITIS: ICD-10-CM

## 2022-10-13 DIAGNOSIS — R06.83 SNORING: ICD-10-CM

## 2022-10-13 PROBLEM — E66.01 MORBID (SEVERE) OBESITY DUE TO EXCESS CALORIES (HCC): Status: ACTIVE | Noted: 2022-10-13

## 2022-10-13 PROCEDURE — 99214 OFFICE O/P EST MOD 30 MIN: CPT | Performed by: INTERNAL MEDICINE

## 2022-10-13 NOTE — PATIENT INSTRUCTIONS
Dar,    1. Great seeing you today! 2. Please double up on your LISINOPRIL from 20 mg's to 40 mg's daily. This will allow for further blood pressure reduction and you should see this reflected in your home #s within the next several weeks. 3. Please get labs done as they relate to other possible causes of chronic fatigue. Please make sure you are fasting for this test.  4. We will be in contact once labs return. In the meantime, please feel free to shoot me a MyChart with some blood pressure reading updates. 5. Please get sleep study done to rule out sleep apnea which can contribute a lot health challenges. Please call the # for the sleep center. 6. See you in 6 weeks. Zachary Kyle. Silvina Rider MD  Diplomate, American Board of Internal Medicine  Member, American College of Lifestyle Medicine  Member, American Association for [address], [address] [place] [address],[address], [facility], [address] [place] [address] Rd  [phone] (phone); [phone] (fax)  Ana Patino. [email or web address]

## 2022-11-04 ENCOUNTER — LAB ENCOUNTER (OUTPATIENT)
Dept: LAB | Age: 67
End: 2022-11-04
Attending: INTERNAL MEDICINE
Payer: MEDICARE

## 2022-11-04 DIAGNOSIS — E03.8 HYPOTHYROIDISM DUE TO HASHIMOTO'S THYROIDITIS: ICD-10-CM

## 2022-11-04 DIAGNOSIS — E06.3 HYPOTHYROIDISM DUE TO HASHIMOTO'S THYROIDITIS: ICD-10-CM

## 2022-11-04 DIAGNOSIS — R73.03 PREDIABETES: ICD-10-CM

## 2022-11-04 DIAGNOSIS — I10 PRIMARY HYPERTENSION: ICD-10-CM

## 2022-11-04 DIAGNOSIS — G93.32 CHRONIC FATIGUE SYNDROME: ICD-10-CM

## 2022-11-04 LAB
ALBUMIN SERPL-MCNC: 3.8 G/DL (ref 3.4–5)
ALBUMIN/GLOB SERPL: 1 {RATIO} (ref 1–2)
ALP LIVER SERPL-CCNC: 100 U/L
ALT SERPL-CCNC: 72 U/L
ANION GAP SERPL CALC-SCNC: 7 MMOL/L (ref 0–18)
AST SERPL-CCNC: 37 U/L (ref 15–37)
BILIRUB SERPL-MCNC: 1.4 MG/DL (ref 0.1–2)
BUN BLD-MCNC: 17 MG/DL (ref 7–18)
CALCIUM BLD-MCNC: 9.4 MG/DL (ref 8.5–10.1)
CHLORIDE SERPL-SCNC: 108 MMOL/L (ref 98–112)
CO2 SERPL-SCNC: 26 MMOL/L (ref 21–32)
CREAT BLD-MCNC: 1.1 MG/DL
DEPRECATED HBV CORE AB SER IA-ACNC: 147.2 NG/ML
EST. AVERAGE GLUCOSE BLD GHB EST-MCNC: 126 MG/DL (ref 68–126)
FASTING STATUS PATIENT QL REPORTED: YES
FOLATE SERPL-MCNC: 7.5 NG/ML (ref 8.7–?)
GFR SERPLBLD BASED ON 1.73 SQ M-ARVRAT: 74 ML/MIN/1.73M2 (ref 60–?)
GLOBULIN PLAS-MCNC: 3.8 G/DL (ref 2.8–4.4)
GLUCOSE BLD-MCNC: 107 MG/DL (ref 70–99)
HBA1C MFR BLD: 6 % (ref ?–5.7)
OSMOLALITY SERPL CALC.SUM OF ELEC: 294 MOSM/KG (ref 275–295)
POTASSIUM SERPL-SCNC: 4 MMOL/L (ref 3.5–5.1)
PROT SERPL-MCNC: 7.6 G/DL (ref 6.4–8.2)
SODIUM SERPL-SCNC: 141 MMOL/L (ref 136–145)
T4 FREE SERPL-MCNC: 1.1 NG/DL (ref 0.8–1.7)
TSI SER-ACNC: 3.08 MIU/ML (ref 0.36–3.74)
VIT B12 SERPL-MCNC: 544 PG/ML (ref 193–986)

## 2022-11-04 PROCEDURE — 82728 ASSAY OF FERRITIN: CPT

## 2022-11-04 PROCEDURE — 84439 ASSAY OF FREE THYROXINE: CPT

## 2022-11-04 PROCEDURE — 84443 ASSAY THYROID STIM HORMONE: CPT

## 2022-11-04 PROCEDURE — 36415 COLL VENOUS BLD VENIPUNCTURE: CPT

## 2022-11-04 PROCEDURE — 82746 ASSAY OF FOLIC ACID SERUM: CPT

## 2022-11-04 PROCEDURE — 83036 HEMOGLOBIN GLYCOSYLATED A1C: CPT

## 2022-11-04 PROCEDURE — 84402 ASSAY OF FREE TESTOSTERONE: CPT

## 2022-11-04 PROCEDURE — 82607 VITAMIN B-12: CPT

## 2022-11-04 PROCEDURE — 80053 COMPREHEN METABOLIC PANEL: CPT

## 2022-11-04 PROCEDURE — 84403 ASSAY OF TOTAL TESTOSTERONE: CPT

## 2022-11-11 LAB
TESTOSTERONE TOTAL: 355.3 NG/DL
TESTOSTERONE, FREE -MS/MS: 46.8 PG/ML

## 2022-11-18 DIAGNOSIS — E06.3 HYPOTHYROIDISM DUE TO HASHIMOTO'S THYROIDITIS: ICD-10-CM

## 2022-11-18 DIAGNOSIS — E03.8 HYPOTHYROIDISM DUE TO HASHIMOTO'S THYROIDITIS: ICD-10-CM

## 2022-11-18 RX ORDER — LISINOPRIL 20 MG/1
20 TABLET ORAL DAILY
Qty: 90 TABLET | Refills: 3 | Status: SHIPPED | OUTPATIENT
Start: 2022-11-18

## 2022-11-18 RX ORDER — SILDENAFIL 100 MG/1
100 TABLET, FILM COATED ORAL
Qty: 30 TABLET | Refills: 1 | Status: SHIPPED | OUTPATIENT
Start: 2022-11-18

## 2022-11-18 RX ORDER — LEVOTHYROXINE SODIUM 0.12 MG/1
125 TABLET ORAL
Qty: 90 TABLET | Refills: 3 | Status: SHIPPED | OUTPATIENT
Start: 2022-11-18

## 2022-11-18 NOTE — TELEPHONE ENCOUNTER
Levothyroxine 125 mcg 1 tab daily filled 8/10/20 90 with 3 refills   Lisinopril 20 mg 1 tab daily filled 4/25/22 90 with 3 refills     LOV 10/13/22   RTC 6 weeks for HTN follow up.   No upcoming apt on file   Labs 11/4/22  Free T4   0.8 - 1.7 ng/dL 1.1      TSH   0.358 - 3.740 mIU/mL 3.080

## 2023-01-19 ENCOUNTER — TELEPHONE (OUTPATIENT)
Dept: INTERNAL MEDICINE CLINIC | Facility: CLINIC | Age: 68
End: 2023-01-19

## 2023-03-01 NOTE — TELEPHONE ENCOUNTER
Refill requested: Lisinopril 20 mg     Passed protocol      Last refill: 1/29/18 #90 3R     Relevant Labs: CMP 8/30/18   BP Readings from Last 3 Encounters:  03/29/18 : 133/86  03/28/18 : (!) 150/107  01/29/18 : 110/80      Last OV / RTC advised: 1/29/18 D no

## 2023-03-14 ENCOUNTER — NURSE ONLY (OUTPATIENT)
Dept: INTERNAL MEDICINE CLINIC | Facility: CLINIC | Age: 68
End: 2023-03-14
Payer: MEDICARE

## 2023-03-14 DIAGNOSIS — Z00.00 LABORATORY EXAMINATION ORDERED AS PART OF A ROUTINE GENERAL MEDICAL EXAMINATION: Primary | ICD-10-CM

## 2023-03-14 DIAGNOSIS — R73.03 PREDIABETES: ICD-10-CM

## 2023-03-14 LAB
AMB EXT CHLORIDE: 103
AMB EXT CHOL/HDL RATIO: 3.1
AMB EXT CHOLESTEROL, TOTAL: 160 MG/DL
AMB EXT CMP ALT: 73 U/L
AMB EXT CMP AST: 46 U/L
AMB EXT GLUCOSE: 103 MG/DL
AMB EXT HDL CHOLESTEROL: 52 MG/DL
AMB EXT HEMATOCRIT: 45.7
AMB EXT HEMOGLOBIN: 15.5
AMB EXT HGBA1C: 6 %
AMB EXT LDL CHOLESTEROL, DIRECT: 91 MG/DL
AMB EXT MCV: 82.8
AMB EXT NON HDL CHOL: 108 MG/DL
AMB EXT POSTASSIUM: 4.2 MMOL/L
AMB EXT PSA SCREEN: 0.77 NG/ML
AMB EXT SODIUM: 143 MMOL/L
AMB EXT TRIGLYCERIDES: 82 MG/DL
AMB EXT TSH: 2.06 MIU/ML
AMB EXT WBC: 4.6 X10(3)UL
BILIRUB UR QL STRIP.AUTO: NEGATIVE
CLARITY UR REFRACT.AUTO: CLEAR
COLOR UR AUTO: YELLOW
CREAT UR-SCNC: 171 MG/DL
GLUCOSE UR STRIP.AUTO-MCNC: NEGATIVE MG/DL
KETONES UR STRIP.AUTO-MCNC: NEGATIVE MG/DL
LEUKOCYTE ESTERASE UR QL STRIP.AUTO: NEGATIVE
MICROALBUMIN UR-MCNC: 1.52 MG/DL
MICROALBUMIN/CREAT 24H UR-RTO: 8.9 UG/MG (ref ?–30)
NITRITE UR QL STRIP.AUTO: NEGATIVE
PH UR STRIP.AUTO: 5 [PH] (ref 5–8)
PROT UR STRIP.AUTO-MCNC: NEGATIVE MG/DL
SP GR UR STRIP.AUTO: 1.02 (ref 1–1.03)
UROBILINOGEN UR STRIP.AUTO-MCNC: <2 MG/DL

## 2023-03-14 PROCEDURE — 82570 ASSAY OF URINE CREATININE: CPT | Performed by: INTERNAL MEDICINE

## 2023-03-14 PROCEDURE — 92551 PURE TONE HEARING TEST AIR: CPT | Performed by: INTERNAL MEDICINE

## 2023-03-14 PROCEDURE — 81001 URINALYSIS AUTO W/SCOPE: CPT | Performed by: INTERNAL MEDICINE

## 2023-03-14 PROCEDURE — 82043 UR ALBUMIN QUANTITATIVE: CPT | Performed by: INTERNAL MEDICINE

## 2023-03-14 PROCEDURE — 99173 VISUAL ACUITY SCREEN: CPT | Performed by: INTERNAL MEDICINE

## 2023-03-14 PROCEDURE — 93923 UPR/LXTR ART STDY 3+ LVLS: CPT | Performed by: INTERNAL MEDICINE

## 2023-03-14 NOTE — PROGRESS NOTES
VENIPUNCTURE: rt arm landed    ADD-ON TEST:    EKG:    SPIROMETRY:    URINE:x      VISION: x     Right Eye 20/40      Left Eye 20/25     Both Eyes: 20/25      FELICITAS:      Right Tibial Foot _160__ divided by highest arm __130_ = _1.2__       Right Dorsal Foot ___ divided by highest arm ___ = ___       Left Tibial Foot _166__ divided by highest arm _130__ = _1.2__       Left Dorsal Foot ___ divided by highest arm ___ = ___      ARM:   Right Brachial-122     Left Brachial-130      FOOT:   Right Tibial (Side)-160    Right Dorsal (Top)-      Left Tibial (Side)-166    Left Dorsal (Top)-      Greater than 1.3: results not reliable  1.01 to 1.3: correlated with history, especially if the patient has diabetes  0.97 to 1: normal  0.8 to 0.96: mild ischemia  0.4 to 0.79: moderate to severe ischemia  0.39 or less: severe ischemia; danger of limb loss

## 2023-04-20 ENCOUNTER — OFFICE VISIT (OUTPATIENT)
Dept: INTERNAL MEDICINE CLINIC | Facility: CLINIC | Age: 68
End: 2023-04-20
Payer: MEDICARE

## 2023-04-20 VITALS
HEART RATE: 72 BPM | TEMPERATURE: 97 F | HEIGHT: 73 IN | RESPIRATION RATE: 16 BRPM | SYSTOLIC BLOOD PRESSURE: 128 MMHG | BODY MASS INDEX: 36.31 KG/M2 | DIASTOLIC BLOOD PRESSURE: 70 MMHG | WEIGHT: 274 LBS | OXYGEN SATURATION: 98 %

## 2023-04-20 DIAGNOSIS — Z85.46 HISTORY OF PROSTATE CANCER: ICD-10-CM

## 2023-04-20 DIAGNOSIS — E66.01 CLASS 2 SEVERE OBESITY DUE TO EXCESS CALORIES WITH SERIOUS COMORBIDITY AND BODY MASS INDEX (BMI) OF 35.0 TO 35.9 IN ADULT (HCC): ICD-10-CM

## 2023-04-20 DIAGNOSIS — K76.0 FATTY LIVER DISEASE, NONALCOHOLIC: ICD-10-CM

## 2023-04-20 DIAGNOSIS — R73.03 PREDIABETES: ICD-10-CM

## 2023-04-20 DIAGNOSIS — E06.3 HYPOTHYROIDISM DUE TO HASHIMOTO'S THYROIDITIS: ICD-10-CM

## 2023-04-20 DIAGNOSIS — Z00.00 ENCOUNTER FOR ANNUAL HEALTH EXAMINATION: Primary | ICD-10-CM

## 2023-04-20 DIAGNOSIS — I10 PRIMARY HYPERTENSION: ICD-10-CM

## 2023-04-20 DIAGNOSIS — I87.2 CHRONIC VENOUS INSUFFICIENCY: ICD-10-CM

## 2023-04-20 DIAGNOSIS — N52.03 COMBINED ARTERIAL INSUFFICIENCY AND CORPORO-VENOUS OCCLUSIVE ERECTILE DYSFUNCTION: ICD-10-CM

## 2023-04-20 DIAGNOSIS — E03.8 HYPOTHYROIDISM DUE TO HASHIMOTO'S THYROIDITIS: ICD-10-CM

## 2023-04-20 DIAGNOSIS — Z86.010 PERSONAL HISTORY OF COLONIC POLYPS: ICD-10-CM

## 2023-04-20 DIAGNOSIS — I83.893 VARICOSE VEINS OF BOTH LOWER EXTREMITIES WITH COMPLICATIONS: ICD-10-CM

## 2023-04-20 RX ORDER — LISINOPRIL 20 MG/1
20 TABLET ORAL DAILY
COMMUNITY

## 2023-04-22 LAB
ATRIAL RATE: 64 BPM
P AXIS: 40 DEGREES
P-R INTERVAL: 246 MS
Q-T INTERVAL: 408 MS
QRS DURATION: 108 MS
QTC CALCULATION (BEZET): 420 MS
R AXIS: -36 DEGREES
T AXIS: 0 DEGREES
VENTRICULAR RATE: 64 BPM

## 2023-04-24 ENCOUNTER — TELEPHONE (OUTPATIENT)
Dept: INTERNAL MEDICINE CLINIC | Facility: CLINIC | Age: 68
End: 2023-04-24

## 2023-04-24 NOTE — TELEPHONE ENCOUNTER
Rita James MD  [facility] Clinical Staff  Please fax my note over to the office of Dr. Ramona Shell from Urology / [facility]. Also, please help to facilitate an appointment with Trino as Dar's PSA is rising. Trino has treated him for prostate cancer. Thx.      Dr. Lori Pinto     Notes faxed     Left Many voice message in regards to apt with [employer]

## 2023-05-01 NOTE — TELEPHONE ENCOUNTER
VM left for pt that a [employer] message was sent with Dr Ashanti Wilhelm contact info    Pt to call back with any questions    Carrie ARAUZ

## 2023-05-23 RX ORDER — LISINOPRIL 20 MG/1
20 TABLET ORAL DAILY
Qty: 90 TABLET | Refills: 3 | Status: SHIPPED | OUTPATIENT
Start: 2023-05-23

## 2023-11-11 DIAGNOSIS — E03.8 HYPOTHYROIDISM DUE TO HASHIMOTO'S THYROIDITIS: ICD-10-CM

## 2023-11-11 DIAGNOSIS — E06.3 HYPOTHYROIDISM DUE TO HASHIMOTO'S THYROIDITIS: ICD-10-CM

## 2023-11-13 RX ORDER — LEVOTHYROXINE SODIUM 0.12 MG/1
125 TABLET ORAL
Qty: 90 TABLET | Refills: 0 | Status: SHIPPED | OUTPATIENT
Start: 2023-11-13

## 2023-12-28 ENCOUNTER — TELEPHONE (OUTPATIENT)
Dept: INTERNAL MEDICINE CLINIC | Facility: CLINIC | Age: 68
End: 2023-12-28

## 2023-12-28 ENCOUNTER — PATIENT OUTREACH (OUTPATIENT)
Dept: CASE MANAGEMENT | Age: 68
End: 2023-12-28

## 2023-12-28 NOTE — PROCEDURES
The office order for PCP removal request is Approved and finalized on December 28, 2023.     Thanks,  [facility] [employer]

## 2023-12-28 NOTE — TELEPHONE ENCOUNTER
Patient canceled their membership with Dr. Bettina Suarez'  practice for financial reasons on 10/29/23.   Please remove Dr. Bettina Suarez as PCP

## 2024-02-07 ENCOUNTER — OFFICE VISIT (OUTPATIENT)
Dept: INTERNAL MEDICINE CLINIC | Facility: CLINIC | Age: 69
End: 2024-02-07
Payer: MEDICARE

## 2024-02-07 VITALS
HEIGHT: 73 IN | HEART RATE: 89 BPM | DIASTOLIC BLOOD PRESSURE: 84 MMHG | OXYGEN SATURATION: 98 % | WEIGHT: 272 LBS | TEMPERATURE: 97 F | BODY MASS INDEX: 36.05 KG/M2 | RESPIRATION RATE: 16 BRPM | SYSTOLIC BLOOD PRESSURE: 150 MMHG

## 2024-02-07 DIAGNOSIS — Z12.5 SCREENING FOR PROSTATE CANCER: ICD-10-CM

## 2024-02-07 DIAGNOSIS — L40.9 PSORIASIS: ICD-10-CM

## 2024-02-07 DIAGNOSIS — E06.3 HYPOTHYROIDISM DUE TO HASHIMOTO'S THYROIDITIS: ICD-10-CM

## 2024-02-07 DIAGNOSIS — I10 PRIMARY HYPERTENSION: Primary | ICD-10-CM

## 2024-02-07 DIAGNOSIS — E03.8 HYPOTHYROIDISM DUE TO HASHIMOTO'S THYROIDITIS: ICD-10-CM

## 2024-02-07 DIAGNOSIS — R73.03 PREDIABETES: ICD-10-CM

## 2024-02-07 DIAGNOSIS — Z85.46 HISTORY OF PROSTATE CANCER: ICD-10-CM

## 2024-02-07 DIAGNOSIS — N52.03 COMBINED ARTERIAL INSUFFICIENCY AND CORPORO-VENOUS OCCLUSIVE ERECTILE DYSFUNCTION: ICD-10-CM

## 2024-02-07 DIAGNOSIS — E66.01 CLASS 2 SEVERE OBESITY DUE TO EXCESS CALORIES WITH SERIOUS COMORBIDITY AND BODY MASS INDEX (BMI) OF 35.0 TO 35.9 IN ADULT  (HCC): ICD-10-CM

## 2024-02-07 PROCEDURE — 99214 OFFICE O/P EST MOD 30 MIN: CPT | Performed by: INTERNAL MEDICINE

## 2024-02-07 NOTE — PROGRESS NOTES
Patient Office Visit    ASSESSMENT AND PLAN:   1. Primary hypertension  Note: Blood pressure was elevated.  Discussed with patient to check his blood pressure every other day for 2 weeks and send a log.  For now continue with current dose of lisinopril.  Discussed with patient that elevated blood pressure due to stress can be harmful as well.  - ALT(SGPT); Future  - AST (SGOT); Future  - Basic Metabolic Panel (8); Future  - TSH W Reflex To Free T4 [E]; Future    2. Class 2 severe obesity due to excess calories with serious comorbidity and body mass index (BMI) of 35.0 to 35.9 in adult  (HCC)  Note: Continue to work on diet and lifestyle changes  - Lipid Panel; Future    3. Hypothyroidism due to Hashimoto's thyroiditis  Note: Continue levothyroxine    4. Prediabetes  Note: Diet controlled  - Lipid Panel; Future  - Hemoglobin A1C; Future    5. Combined arterial insufficiency and corporo-venous occlusive erectile dysfunction  Note: Continue follow-up with urology.  Symptoms started after his prostate cancer treatment    6. History of prostate cancer s/p radiation treatment   Note: Referral to urology provided  - CBC With Differential With Platelet; Future  - PSA Total, Screen; Future  - Urology Referral - In Network    7. Screening for prostate cancer  - PSA Total, Screen; Future    8. Psoriasis  Note: Patient declining to see dermatology at this time    DMV forms filled out  Return to clinic in 6 months or sooner for any acute concerns  Recommended the shingles vaccine.  Patient declined the pneumonia vaccine today    Patient/Caregiver Education: Patient/Caregiver Education: There are no barriers to learning. Medical education done. Outcome: Patient verbalizes understanding. Patient is notified to call with any questions, complications, allergies, or worsening or changing symptoms.  Patient is to call with any side effects or complications from the treatments as a result of today.      Reviewed Past Medical History  and   Patient Active Problem List   Diagnosis    Prediabetes    HTN (hypertension)    Fatty liver disease, nonalcoholic - via abd U/S 1/6/14    Combined arterial insufficiency and corporo-venous occlusive erectile dysfunction    Chronic venous insufficiency    Hypothyroidism due to Hashimoto's thyroiditis    Class 2 severe obesity due to excess calories with serious comorbidity and body mass index (BMI) of 35.0 to 35.9 in adult  (HCC)    History of prostate cancer s/p radiation treatment with Dr. Mackenzie    Varicose veins of both lower extremities with complications    Personal history of colonic polyps       Orders Placed This Encounter   Procedures    ALT(SGPT)     Standing Status:   Future     Standing Expiration Date:   2/7/2025    AST (SGOT)     Standing Status:   Future     Standing Expiration Date:   2/7/2025    Basic Metabolic Panel (8)     Standing Status:   Future     Standing Expiration Date:   2/7/2025    Lipid Panel     Standing Status:   Future     Standing Expiration Date:   2/7/2025    CBC With Differential With Platelet     Standing Status:   Future     Standing Expiration Date:   2/7/2025    PSA Total, Screen     Standing Status:   Future     Standing Expiration Date:   2/7/2025    Hemoglobin A1C     Standing Status:   Future     Standing Expiration Date:   2/7/2025    TSH W Reflex To Free T4 [E]     Standing Status:   Future     Standing Expiration Date:   2/7/2025     Order Specific Question:   Release to patient     Answer:   Immediate     Requested Prescriptions      No prescriptions requested or ordered in this encounter         Montserrat Azevedo DO  CC:  Chief Complaint   Patient presents with    New Patient     Establish care and med refill         HPI:   Nico Lehman  is a 68-year-old male who presents for follow-up    History of DUI: About 8 to 9 years ago.  He needs paperwork filled out so he can start driving again  Hypertension: Compliant with his medication.  He has a lot of  stress from work and thinks that could be causing an elevation in his blood pressure.  He gets occasional headaches but generally it is stress related from work.  Hypothyroidism: Stable on medication  History of prostate cancer: He used to see a urologist in the past but would like a new referral.    Past Medical History:   Diagnosis Date    Arthritis     Belching     Cancer (HCC)     PROSTATE  CA    Constipation     Diarrhea, unspecified     Disorder of prostate     Disorder of thyroid     Fatigue     Flatulence/gas pain/belching     Frequent urination     Hearing loss     High blood pressure     Leg swelling     Osteoarthritis     Pain in joints     Stool incontinence     Visual impairment     contacts/glasses    Wears glasses        Past Surgical History:   Procedure Laterality Date    CHOLECYSTECTOMY      COLONOSCOPY      HERNIA SURGERY  03/29/2018    umbilical hernia repair    HIP REPLACEMENT SURGERY  06/2013(right)    Left 09/2019    KNEE REPLACEMENT SURGERY      reconstruction (R,1990)(L,1991)    KNEE SURGERY Bilateral     numerous surgeries to both knees-arthroscopic and reconstruction type    TOTAL HIP REPLACEMENT Bilateral     WISDOM TEETH REMOVED      wisdom teeth       Social History:  Social History     Socioeconomic History    Marital status:     Number of children: 4   Occupational History    Occupation:  - [employer]   Tobacco Use    Smoking status: Never    Smokeless tobacco: Never    Tobacco comments:     OCC CIGAR   Vaping Use    Vaping Use: Never used   Substance and Sexual Activity    Alcohol use: Yes     Alcohol/week: 6.0 standard drinks of alcohol     Types: 2 Cans of beer, 4 Shots of liquor per week    Drug use: No    Sexual activity: Yes     Partners: Female   Other Topics Concern    Caffeine Concern Yes     Comment: 2 cups of coffee daily and 1 can of pop daily.     Stress Concern No    Weight Concern Yes    Special Diet No    Exercise Yes     Comment: very little     Seat Belt Yes   Social History Narrative    , lives with wife and 2 sons    Has 4 children - 2 daughters not living at home     Family History:  Family History   Problem Relation Age of Onset    Diabetes Father     Other (Alzheimer's disease) Father     Other (Leukemia) Mother     Breast Cancer Sister      Allergies:  No Known Allergies  Current Meds:  Current Outpatient Medications on File Prior to Visit   Medication Sig Dispense Refill    levothyroxine 125 MCG Oral Tab Take 1 tablet (125 mcg total) by mouth before breakfast. 90 tablet 0    lisinopril 20 MG Oral Tab Take 1 tablet (20 mg total) by mouth daily. 90 tablet 3     No current facility-administered medications on file prior to visit.         REVIEW OF SYSTEMS   Constitutional: no fatigue normal energy no weight changes   HENT: normal sinuses and no mouth issues   Eyes: . normal vision no eye pain   Respiratory: normal respirations no cough   Cardiovascular: no CP, or palpitations   Gastrointestinal: normal bowels and no abd pains   Genitourinary:  normal urination no hematuria, no frequency   Musculoskeletal: no pains in arms/legs, normal range of motion   Skin: no rashes or skin lesions that are new   Neurological:  no weakness, no numbness, normal gait   Hematological:  no bruises or bleeding   Psychiatric/Behavioral: normal mood no anxiety normal behavior     /84 (BP Location: Right arm, Patient Position: Sitting, Cuff Size: adult)   Pulse 89   Temp 97.3 °F (36.3 °C) (Temporal)   Resp 16   Ht 6' 1\" (1.854 m)   Wt 272 lb (123.4 kg)   SpO2 98%   BMI 35.89 kg/m²     PHYSICAL EXAM:   Constitutional: Vital signs reviewed as noted, well developed, in no acute distress.   HENT: NCAT  Eyes: pupils reactive bilaterally  Neck: No thyroidmegaly  Cardiovascular: nl s1 s2 no m/r/g  Pulmonary/Chest: CTA bilaterally with no wheezes  Extremities: no pedal edema   Neurological:  no weakness in UE and LE, reflexes are normal  Skin: Take/silvery scaly  lesions noted in the elbows bilaterally  Psychiatric:normal mood

## 2024-02-07 NOTE — PATIENT INSTRUCTIONS
Continue to exercise at least 150 minutes a week and Eat a plant based diet      Please take 2000 IU of vitamin D daily for life to keep your bones strong    I do recommend the second shingles vaccine from the pharmacy. Take it on a Friday as it is a strong vaccine    I have ordered blood work for you    Check your blood pressure every other night for 2 weeks and send a log. Our goal blood pressure is to be less than 140/90    Please see the urologist    See me every 6 months, but sooner if your blood pressure is still high

## 2024-02-10 DIAGNOSIS — E06.3 HYPOTHYROIDISM DUE TO HASHIMOTO'S THYROIDITIS: ICD-10-CM

## 2024-02-10 DIAGNOSIS — E03.8 HYPOTHYROIDISM DUE TO HASHIMOTO'S THYROIDITIS: ICD-10-CM

## 2024-02-12 RX ORDER — LEVOTHYROXINE SODIUM 0.12 MG/1
125 TABLET ORAL
Qty: 90 TABLET | Refills: 0 | Status: SHIPPED | OUTPATIENT
Start: 2024-02-12

## 2024-03-01 ENCOUNTER — LAB ENCOUNTER (OUTPATIENT)
Dept: LAB | Age: 69
End: 2024-03-01
Attending: INTERNAL MEDICINE
Payer: MEDICARE

## 2024-03-01 DIAGNOSIS — E66.01 CLASS 2 SEVERE OBESITY DUE TO EXCESS CALORIES WITH SERIOUS COMORBIDITY AND BODY MASS INDEX (BMI) OF 35.0 TO 35.9 IN ADULT (HCC): ICD-10-CM

## 2024-03-01 DIAGNOSIS — R73.03 PREDIABETES: ICD-10-CM

## 2024-03-01 DIAGNOSIS — I10 PRIMARY HYPERTENSION: ICD-10-CM

## 2024-03-01 DIAGNOSIS — Z85.46 HISTORY OF PROSTATE CANCER: ICD-10-CM

## 2024-03-01 DIAGNOSIS — Z12.5 SCREENING FOR PROSTATE CANCER: ICD-10-CM

## 2024-03-01 LAB
ALT SERPL-CCNC: 90 U/L
ANION GAP SERPL CALC-SCNC: 7 MMOL/L (ref 0–18)
AST SERPL-CCNC: 48 U/L (ref 15–37)
BASOPHILS # BLD AUTO: 0.05 X10(3) UL (ref 0–0.2)
BASOPHILS NFR BLD AUTO: 1.1 %
BUN BLD-MCNC: 13 MG/DL (ref 9–23)
CALCIUM BLD-MCNC: 9.7 MG/DL (ref 8.5–10.1)
CHLORIDE SERPL-SCNC: 106 MMOL/L (ref 98–112)
CHOLEST SERPL-MCNC: 143 MG/DL (ref ?–200)
CO2 SERPL-SCNC: 24 MMOL/L (ref 21–32)
COMPLEXED PSA SERPL-MCNC: 0.35 NG/ML (ref ?–4)
CREAT BLD-MCNC: 0.95 MG/DL
EGFRCR SERPLBLD CKD-EPI 2021: 88 ML/MIN/1.73M2 (ref 60–?)
EOSINOPHIL # BLD AUTO: 0.19 X10(3) UL (ref 0–0.7)
EOSINOPHIL NFR BLD AUTO: 4.1 %
ERYTHROCYTE [DISTWIDTH] IN BLOOD BY AUTOMATED COUNT: 13 %
EST. AVERAGE GLUCOSE BLD GHB EST-MCNC: 126 MG/DL (ref 68–126)
FASTING PATIENT LIPID ANSWER: YES
FASTING STATUS PATIENT QL REPORTED: YES
GLUCOSE BLD-MCNC: 105 MG/DL (ref 70–99)
HBA1C MFR BLD: 6 % (ref ?–5.7)
HCT VFR BLD AUTO: 45.6 %
HDLC SERPL-MCNC: 51 MG/DL (ref 40–59)
HGB BLD-MCNC: 15.7 G/DL
IMM GRANULOCYTES # BLD AUTO: 0.01 X10(3) UL (ref 0–1)
IMM GRANULOCYTES NFR BLD: 0.2 %
LDLC SERPL CALC-MCNC: 79 MG/DL (ref ?–100)
LYMPHOCYTES # BLD AUTO: 1.01 X10(3) UL (ref 1–4)
LYMPHOCYTES NFR BLD AUTO: 21.9 %
MCH RBC QN AUTO: 28.4 PG (ref 26–34)
MCHC RBC AUTO-ENTMCNC: 34.4 G/DL (ref 31–37)
MCV RBC AUTO: 82.5 FL
MONOCYTES # BLD AUTO: 0.35 X10(3) UL (ref 0.1–1)
MONOCYTES NFR BLD AUTO: 7.6 %
NEUTROPHILS # BLD AUTO: 3 X10 (3) UL (ref 1.5–7.7)
NEUTROPHILS # BLD AUTO: 3 X10(3) UL (ref 1.5–7.7)
NEUTROPHILS NFR BLD AUTO: 65.1 %
NONHDLC SERPL-MCNC: 92 MG/DL (ref ?–130)
OSMOLALITY SERPL CALC.SUM OF ELEC: 284 MOSM/KG (ref 275–295)
PLATELET # BLD AUTO: 137 10(3)UL (ref 150–450)
POTASSIUM SERPL-SCNC: 4 MMOL/L (ref 3.5–5.1)
RBC # BLD AUTO: 5.53 X10(6)UL
SODIUM SERPL-SCNC: 137 MMOL/L (ref 136–145)
TRIGL SERPL-MCNC: 63 MG/DL (ref 30–149)
TSI SER-ACNC: 2.13 MIU/ML (ref 0.36–3.74)
VLDLC SERPL CALC-MCNC: 10 MG/DL (ref 0–30)
WBC # BLD AUTO: 4.6 X10(3) UL (ref 4–11)

## 2024-03-01 PROCEDURE — 84450 TRANSFERASE (AST) (SGOT): CPT

## 2024-03-01 PROCEDURE — 83036 HEMOGLOBIN GLYCOSYLATED A1C: CPT

## 2024-03-01 PROCEDURE — 80048 BASIC METABOLIC PNL TOTAL CA: CPT

## 2024-03-01 PROCEDURE — 80061 LIPID PANEL: CPT

## 2024-03-01 PROCEDURE — 84443 ASSAY THYROID STIM HORMONE: CPT

## 2024-03-01 PROCEDURE — 36415 COLL VENOUS BLD VENIPUNCTURE: CPT

## 2024-03-01 PROCEDURE — 85025 COMPLETE CBC W/AUTO DIFF WBC: CPT

## 2024-03-01 PROCEDURE — 84460 ALANINE AMINO (ALT) (SGPT): CPT

## 2024-05-17 RX ORDER — LISINOPRIL 20 MG/1
20 TABLET ORAL DAILY
Qty: 90 TABLET | Refills: 0 | OUTPATIENT
Start: 2024-05-17

## 2024-05-22 DIAGNOSIS — E03.8 HYPOTHYROIDISM DUE TO HASHIMOTO'S THYROIDITIS: ICD-10-CM

## 2024-05-22 DIAGNOSIS — E06.3 HYPOTHYROIDISM DUE TO HASHIMOTO'S THYROIDITIS: ICD-10-CM

## 2024-05-22 RX ORDER — LEVOTHYROXINE SODIUM 0.12 MG/1
125 TABLET ORAL
Qty: 90 TABLET | Refills: 0 | OUTPATIENT
Start: 2024-05-22

## 2024-05-23 RX ORDER — LISINOPRIL 20 MG/1
20 TABLET ORAL DAILY
Qty: 90 TABLET | Refills: 1 | Status: SHIPPED | OUTPATIENT
Start: 2024-05-23

## 2024-05-23 RX ORDER — LEVOTHYROXINE SODIUM 0.12 MG/1
125 TABLET ORAL
Qty: 90 TABLET | Refills: 0 | Status: SHIPPED | OUTPATIENT
Start: 2024-05-23

## 2024-05-23 NOTE — TELEPHONE ENCOUNTER
Passed protocol     Last refill:  levothyroxine 125 MCG Oral Tab 90 tablet 0 2/12/2024 --   Sig:   Take 1 tablet (125 mcg total) by mouth before breakfast.     Route:   Oral       LOV   2/7/2024 Dr Azevedo RT 6 months  3. Hypothyroidism due to Hashimoto's thyroiditis  Note: Continue levothyroxine   No FOV scheduled

## 2024-05-23 NOTE — TELEPHONE ENCOUNTER
Failed protocol     Last refill: (sent under previous PCP)  lisinopril 20 MG Oral Tab 90 tablet 3 5/23/2023 --   Sig:   Take 1 tablet (20 mg total) by mouth daily.         LOV   2/7/2024 Dr Azevedo   1. Primary hypertension  Note: Blood pressure was elevated.  Discussed with patient to check his blood pressure every other day for 2 weeks and send a log.  For now continue with current dose of lisinopril.  Discussed with patient that elevated blood pressure due to stress can be harmful as well.  - ALT(SGPT); Future  - AST (SGOT); Future  - Basic Metabolic Panel (8); Future  - TSH W Reflex To Free T4 [E]; Future  No FOV scheduled

## 2024-08-07 ENCOUNTER — PATIENT OUTREACH (OUTPATIENT)
Dept: INTERNAL MEDICINE CLINIC | Facility: CLINIC | Age: 69
End: 2024-08-07

## 2024-08-23 DIAGNOSIS — E06.3 HYPOTHYROIDISM DUE TO HASHIMOTO'S THYROIDITIS: ICD-10-CM

## 2024-08-23 RX ORDER — LEVOTHYROXINE SODIUM 125 UG/1
125 TABLET ORAL
Qty: 90 TABLET | Refills: 0 | OUTPATIENT
Start: 2024-08-23

## 2024-08-23 RX ORDER — LEVOTHYROXINE SODIUM 125 UG/1
125 TABLET ORAL
Qty: 90 TABLET | Refills: 0 | Status: SHIPPED | OUTPATIENT
Start: 2024-08-23

## 2024-08-23 NOTE — TELEPHONE ENCOUNTER
Requested Prescriptions     Signed Prescriptions Disp Refills    LEVOTHYROXINE 125 MCG Oral Tab 90 tablet 0     Sig: Take 1 tablet (125 mcg total) by mouth before breakfast.     Authorizing Provider: HEIDE ISLAS     Ordering User: JOHN KEATING

## 2024-08-26 NOTE — TELEPHONE ENCOUNTER
Protocol FAIL    Requesting: lisinopril 20 MG Oral Tab     LOV:2/7/24  RTC: 6 MONTHS  Filled: 5/23/24 90 TABLET 1 REFILL  Recent Labs: 3/1/24    Upcoming OV   No future appointments.

## 2024-08-27 RX ORDER — LISINOPRIL 20 MG/1
20 TABLET ORAL DAILY
Qty: 90 TABLET | Refills: 0 | Status: SHIPPED | OUTPATIENT
Start: 2024-08-27

## 2024-12-02 ENCOUNTER — PATIENT MESSAGE (OUTPATIENT)
Dept: INTERNAL MEDICINE CLINIC | Facility: CLINIC | Age: 69
End: 2024-12-02

## 2024-12-02 DIAGNOSIS — Z12.5 SCREENING FOR PROSTATE CANCER: Primary | ICD-10-CM

## 2024-12-02 DIAGNOSIS — E06.3 HYPOTHYROIDISM DUE TO HASHIMOTO'S THYROIDITIS: ICD-10-CM

## 2024-12-03 RX ORDER — LEVOTHYROXINE SODIUM 125 UG/1
125 TABLET ORAL
Qty: 90 TABLET | Refills: 0 | Status: SHIPPED | OUTPATIENT
Start: 2024-12-03

## 2024-12-03 NOTE — TELEPHONE ENCOUNTER
SV: Last PSA was done 3/1/2024. Do you want to order another PSA for this patient? Pended order. TY

## 2024-12-04 RX ORDER — LISINOPRIL 20 MG/1
20 TABLET ORAL DAILY
Qty: 90 TABLET | Refills: 0 | Status: SHIPPED | OUTPATIENT
Start: 2024-12-04

## 2024-12-04 NOTE — TELEPHONE ENCOUNTER
ANITA--Patient states he will wait until March 2025    Dar Lehman Jr.  [facility] Clinical Staff (supporting Montserrat Azevedo DO)17 hours ago (2:44 PM)       No, thank you. I can wait till March.  Again thank you.

## 2024-12-04 NOTE — TELEPHONE ENCOUNTER
Protocol FAIL    Requesting: lisinopril 20 MG Oral Tab     LOV: 2/7/24  RTC: 6 MONTHS  Filled: 8/27/24 90 TABLET 0 REFILL  Recent Labs: 3/1/24    Upcoming OV   No future appointments.

## 2025-02-18 DIAGNOSIS — E06.3 HYPOTHYROIDISM DUE TO HASHIMOTO'S THYROIDITIS: ICD-10-CM

## 2025-02-18 RX ORDER — LEVOTHYROXINE SODIUM 125 UG/1
125 TABLET ORAL
Qty: 90 TABLET | Refills: 0 | Status: SHIPPED | OUTPATIENT
Start: 2025-02-18

## (undated) DIAGNOSIS — E03.8 HYPOTHYROIDISM DUE TO HASHIMOTO'S THYROIDITIS: ICD-10-CM

## (undated) DIAGNOSIS — E06.3 HYPOTHYROIDISM DUE TO HASHIMOTO'S THYROIDITIS: ICD-10-CM

## (undated) DIAGNOSIS — R93.3 ABNORMAL COLONOSCOPY: Primary | ICD-10-CM

## (undated) DEVICE — SUTURE MONOCRYL 4-0 PS-2

## (undated) DEVICE — OPEN-END FLEXI-TIP URETERAL CATHETER: Brand: FLEXI-TIP

## (undated) DEVICE — CHLORAPREP 26ML APPLICATOR

## (undated) DEVICE — NITINOL WIRE STR 035

## (undated) DEVICE — LAP CHOLE/APPY CDS-LF: Brand: MEDLINE INDUSTRIES, INC.

## (undated) DEVICE — SHEET,DRAPE,70X100,STERILE: Brand: MEDLINE

## (undated) DEVICE — DISPOSABLE LAPAROSCOPIC CLIP APPLIER WITH 20 CLIPS.: Brand: EPIX® UNIVERSAL CLIP APPLIER

## (undated) DEVICE — TROCAR: Brand: KII SHIELDED BLADED ACCESS SYSTEM

## (undated) DEVICE — CODMAN® INTEGRATED BIPOLAR CORD AND TUBING SET FLYING LEADS, ROTARY PUMP: Brand: CODMAN®

## (undated) DEVICE — TROCAR: Brand: KII® SLEEVE

## (undated) DEVICE — BLADE ELECTRODE: Brand: EDGE

## (undated) DEVICE — DRAPE C-ARM UNIVERSAL

## (undated) DEVICE — Device

## (undated) DEVICE — Device: Brand: STABLECUT®

## (undated) DEVICE — 3M™ COBAN™ NL STERILE NON-LATEX SELF-ADHERENT WRAP, 2084S, 4 IN X 5 YD (10 CM X 4,5 M), 18 ROLLS/CASE: Brand: 3M™ COBAN™

## (undated) DEVICE — STERILE POLYISOPRENE POWDER-FREE SURGICAL GLOVES: Brand: PROTEXIS

## (undated) DEVICE — SOL  .9 1000ML BTL

## (undated) DEVICE — HOOD, PEEL-AWAY: Brand: FLYTE

## (undated) DEVICE — MONOFILAMENT ABSORBABLE SUTURE: Brand: MAXON

## (undated) DEVICE — SUTURE VICRYL 2-0 CP-1

## (undated) DEVICE — KENDALL SCD EXPRESS SLEEVES, KNEE LENGTH, MEDIUM: Brand: KENDALL SCD

## (undated) DEVICE — TISSUE RETRIEVAL SYSTEM: Brand: INZII RETRIEVAL SYSTEM

## (undated) DEVICE — PILLOW ABDUCTION HIP SM

## (undated) DEVICE — TIBURON DRAPE TOWELS: Brand: CONVERTORS

## (undated) DEVICE — SUTURE ETHIBOND 5 CCS

## (undated) DEVICE — DECANTER BAG 9": Brand: MEDLINE INDUSTRIES, INC.

## (undated) DEVICE — GLOVE RADIATION SZ 8-1/2

## (undated) DEVICE — DRESSING AQUACEL AG 3.5 X 10

## (undated) DEVICE — GOWN SURG AERO CHROME XXL

## (undated) DEVICE — INSTRUMENT FEE

## (undated) DEVICE — LIGAMAX 5 MM ENDOSCOPIC MULTIPLE CLIP APPLIER: Brand: LIGAMAX

## (undated) DEVICE — PADDING CAST COTTON  4

## (undated) DEVICE — Device: Brand: PLUMEPEN

## (undated) DEVICE — SUTURE VICRYL 1 CPX

## (undated) DEVICE — WRAP COOLING HIP W/ICE PILLOW

## (undated) DEVICE — ANTERIOR HIP: Brand: MEDLINE INDUSTRIES, INC.

## (undated) DEVICE — SPECIMEN CONTAINER,POSITIVE SEAL INDICATOR, OR PACKAGED: Brand: PRECISION

## (undated) NOTE — LETTER
[facility]  Tram Russ [address] [facility], [address] [place] [address]    Consent for Operation    Date: __________________    Time: _______________    1.  I authorize the performance upon Ita Powers. the following operation:    Procedure(s):  LAPAROSCOPIC procedure has been videotaped, the surgeon will obtain the original videotape. The hospital will not be responsible for storage or maintenance of this tape.     6. For the purpose of advancing medical education, I consent to the admittance of observers to t STATEMENTS REQUIRING INSERTION OR COMPLETION WERE FILLED IN.     Signature of Patient:   ___________________________    When the patient is a minor or mentally incompetent to give consent:  Signature of person authorized to consent for patient: ____________ supplements, and pills I can buy without a prescription (including street drugs/illegal medications). Failure to inform my anesthesiologist about these medicines may increase my risk of anesthetic complications.   · If I am allergic to anything or have had Anesthesiologist Signature     Date   Time  I have discussed the procedure and information above with the patient (or patient’s representative) and answered their questions. The patient or their representative has agreed to have anesthesia services.     ___

## (undated) NOTE — Clinical Note
Please fax my note over to the office of Dr. Patricio Pozo from Urology w/ [facility]. Also, please help to facilitate an appointment with Trino as Dar's PSA is rising. Trino has treated him for prostate cancer. Thx.   Dr. Alba Conception

## (undated) NOTE — Clinical Note
Fidelina Paris, this is just an [facility]. I saw this patient on Friday. He is complaining of no improvement in his perceived penile shrinkage since his treatments ceased several months ago.  He's inquiring how long this may last. Perhaps your nurse can reach out to him f

## (undated) NOTE — Clinical Note
Please fax my note to Triny Hill. Yoselin Torres. Nelly Isaac, American [facility], [address] [facility] [address]  [facility],[address] Floor, [address], [place] [address], [address] [place] [address]: [phone]; F: Bert [address]

## (undated) NOTE — Clinical Note
Matt,Mr. Mary Leong is considering radiation therapy for his prostate cancer. Unfortunately there does appear to be an enlarged femoral lymph node for which I am sending him for a percutaneous biopsy.   Please have your office call him to schedule consulta

## (undated) NOTE — MR AVS SNAPSHOT
[place]  [address] [facility] [address]  [address] [facility] [address] [phone]               Thank you for choosing us for your health care visit with Cordell Estrada MD.  We are glad to serve you and happy to provide you with this summa Referred By    Referred To    Yobany Carrasquillo MD   [address]   Phone:  [phone]   Fax:  [phone]    Diagnoses:  Elevated PSA   Order:  Urology - Internal    Ed MD [facility]  [address]   [address]   N schedule your appointment. If you are confident that your benefit plan will not require a referral or authorization, such as [place] Medicaid, please feel free to schedule your appointment immediately.  However, if you are unsure about the requirements Tips for making healthy food choices  -   Enjoy your food, but eat less. Fully enjoy your food when eating. Don’t eat while distracted and slow down. Avoid over sized portions. Don’t eat while when you’re bored.      EAT THESE FOODS MORE OFTEN: EAT T

## (undated) NOTE — LETTER
[facility] Testing Department  Phone: [phone]  Right Fax: [phone]  [facility] By:  Arian Abdalla RN Date: 9/4/18    Patient Name: Domitila Jose  Surgery Date: 9/12/2018    CSN: [id number]  Medical Record: [id number]

## (undated) NOTE — LETTER
04/05/18    Dear Erik Nieto MD,    I am seeing Robinson To in the office today after laparoscopic cholecystectomy and umbilical hernia repair on 3/29/2018.             Assessment   Follow-up examination  (primary encounter diagnosis)      Plan

## (undated) NOTE — Clinical Note
Please see if we can get him some samples of Viagra 50 mg tabs. Thanks. Alexys Bowie. Martin Alvarez MD Diplomate, American Board of Internal Medicine [facility] [address] [place] [address],[address], [address], [facility], [address] [place] [address] T: [id number]; F: [address]

## (undated) NOTE — LETTER
[facility]  Phone: [phone]  Right Fax: [phone]  [facility] 20 By:  Kezia Garcia RN Date: 8/30/18    Patient Name: Elisha Dakins Jr.,Nico  Surgery Date: 9/12/2018    CSN: [id number]  Medical Record: [id number]

## (undated) NOTE — Clinical Note
Lela Iglesiasable, just ANITA on this patient. He's cleared for his procedure on 1/22/20. Loretta Bello